# Patient Record
Sex: FEMALE | Race: WHITE | Employment: FULL TIME | URBAN - METROPOLITAN AREA
[De-identification: names, ages, dates, MRNs, and addresses within clinical notes are randomized per-mention and may not be internally consistent; named-entity substitution may affect disease eponyms.]

---

## 2019-09-04 ENCOUNTER — OFFICE VISIT (OUTPATIENT)
Dept: FAMILY MEDICINE | Facility: CLINIC | Age: 34
End: 2019-09-04
Payer: COMMERCIAL

## 2019-09-04 VITALS
SYSTOLIC BLOOD PRESSURE: 125 MMHG | DIASTOLIC BLOOD PRESSURE: 88 MMHG | TEMPERATURE: 98.2 F | WEIGHT: 293 LBS | RESPIRATION RATE: 21 BRPM | BODY MASS INDEX: 47.09 KG/M2 | HEART RATE: 75 BPM | HEIGHT: 66 IN | OXYGEN SATURATION: 96 %

## 2019-09-04 DIAGNOSIS — E66.01 MORBID OBESITY (H): ICD-10-CM

## 2019-09-04 DIAGNOSIS — R40.0 DAYTIME SOMNOLENCE: ICD-10-CM

## 2019-09-04 DIAGNOSIS — Z12.4 SCREENING FOR MALIGNANT NEOPLASM OF CERVIX: ICD-10-CM

## 2019-09-04 DIAGNOSIS — Z00.00 ROUTINE GENERAL MEDICAL EXAMINATION AT A HEALTH CARE FACILITY: Primary | ICD-10-CM

## 2019-09-04 DIAGNOSIS — N91.2 AMENORRHEA: ICD-10-CM

## 2019-09-04 DIAGNOSIS — R06.83 SNORING: ICD-10-CM

## 2019-09-04 PROCEDURE — 99385 PREV VISIT NEW AGE 18-39: CPT | Performed by: PHYSICIAN ASSISTANT

## 2019-09-04 PROCEDURE — G0145 SCR C/V CYTO,THINLAYER,RESCR: HCPCS | Performed by: PHYSICIAN ASSISTANT

## 2019-09-04 PROCEDURE — 87624 HPV HI-RISK TYP POOLED RSLT: CPT | Performed by: PHYSICIAN ASSISTANT

## 2019-09-04 PROCEDURE — 99214 OFFICE O/P EST MOD 30 MIN: CPT | Mod: 25 | Performed by: PHYSICIAN ASSISTANT

## 2019-09-04 ASSESSMENT — MIFFLIN-ST. JEOR: SCORE: 2299.7

## 2019-09-04 ASSESSMENT — PAIN SCALES - GENERAL: PAINLEVEL: NO PAIN (0)

## 2019-09-04 NOTE — PATIENT INSTRUCTIONS
Please schedule a fasting lab appointment (nothing to eat or drink 10    hours prior except water and/or your medications) at your earliest convenience.      Schedule follow up with weight management specialists  Schedule follow up with sleep center  I will send letter with lab results if normal and call with abnormal results.   Consider metformin for possible PCOS     Patient Education             Preventive Health Recommendations  Female Ages 26 - 39  Yearly exam:   See your health care provider every year in order to    Review health changes.     Discuss preventive care.      Review your medicines if you your doctor has prescribed any.    Until age 30: Get a Pap test every three years (more often if you have had an abnormal result).    After age 30: Talk to your doctor about whether you should have a Pap test every 3 years or have a Pap test with HPV screening every 5 years.   You do not need a Pap test if your uterus was removed (hysterectomy) and you have not had cancer.  You should be tested each year for STDs (sexually transmitted diseases), if you're at risk.   Talk to your provider about how often to have your cholesterol checked.  If you are at risk for diabetes, you should have a diabetes test (fasting glucose).  Shots: Get a flu shot each year. Get a tetanus shot every 10 years.   Nutrition:     Eat at least 5 servings of fruits and vegetables each day.    Eat whole-grain bread, whole-wheat pasta and brown rice instead of white grains and rice.    Get adequate Calcium and Vitamin D.     Lifestyle    Exercise at least 150 minutes a week (30 minutes a day, 5 days of the week). This will help you control your weight and prevent disease.    Limit alcohol to one drink per day.    No smoking.     Wear sunscreen to prevent skin cancer.    See your dentist every six months for an exam and cleaning.

## 2019-09-04 NOTE — LETTER
September 15, 2019    Lindsaytravis Singleton  [address]  [facility] [address]    Dear ,  This letter is regarding your recent Pap smear (cervical cancer screening) and Human Papillomavirus (HPV) test.  We are happy to inform you that your Pap smear result is normal. Cervical cancer is closely linked with certain types of HPV. Your results showed no evidence of high-risk HPV.  We recommend you have your next PAP smear and HPV test in 5 years.  You will still need to return to the clinic every year for an annual exam and other preventive tests.  If you have additional questions regarding this result, please call our registered nurse, Joanna at [phone].  Sincerely,    Lorri Johnson PA-C /kumar

## 2019-09-04 NOTE — PROGRESS NOTES
SUBJECTIVE:   CC: Lindsay Singleton is an 34 year old woman who presents for preventive health visit.     Healthy Habits:    Do you get at least three servings of calcium containing foods daily (dairy, green leafy vegetables, etc.)? yes    Amount of exercise or daily activities, outside of work: none    Problems taking medications regularly not applicable    Medication side effects: No    Have you had an eye exam in the past two years? yes    Do you see a dentist twice per year? no    Do you have sleep apnea, excessive snoring or daytime drowsiness?yes          Today's PHQ-2 Score:   PHQ-2 ( 1999 [employer]) 9/4/2019   Q1: Little interest or pleasure in doing things 0   Q2: Feeling down, depressed or hopeless 0   PHQ-2 Score 0       Abuse: Current or Past(Physical, Sexual or Emotional)- No  Do you feel safe in your environment? Yes    Social History     Tobacco Use     Smoking status: Never Smoker     Smokeless tobacco: Never Used   Substance Use Topics     Alcohol use: Yes     If you drink alcohol do you typically have >3 drinks per day or >7 drinks per week? No                     Reviewed orders with patient.  Reviewed health maintenance and updated orders accordingly - Yes  BP Readings from Last 3 Encounters:   09/04/19 125/88    Wt Readings from Last 3 Encounters:   09/04/19 (!) 157.4 kg (347 lb)                  Patient Active Problem List   Diagnosis     Morbid obesity (H)     Past Surgical History:   Procedure Laterality Date     NO HISTORY OF SURGERY         Social History     Tobacco Use     Smoking status: Never Smoker     Smokeless tobacco: Never Used   Substance Use Topics     Alcohol use: Yes     Family History   Problem Relation Age of Onset     Diabetes Paternal Grandmother         late 50s      Unknown/Adopted Mother      Cerebrovascular Disease Paternal Grandfather         78      Colon Cancer No family hx of      Coronary Artery Disease No family hx of          No current outpatient medications on  file.       Mammogram not appropriate for this patient based on age.    Pertinent mammograms are reviewed under the imaging tab.  History of abnormal Pap smear: NO - age 30-65 PAP every 5 years with negative HPV co-testing recommended     Reviewed and updated as needed this visit by clinical staff  Tobacco  Allergies  Meds  Med Hx  Surg Hx  Fam Hx  Soc Hx        Reviewed and updated as needed this visit by Provider  Tobacco  Allergies  Meds  Problems  Med Hx  Surg Hx  Fam Hx  Soc Hx           Pap smear through planned approximately 2 yrs ago normal - but willing to repeat pap today    Works full time for [employer].  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  Never had sleep study  Not opposed to sleep study  As gained weight snoring gets louder and longing through the night  Gained a lot of weight  Has been on weight watchers several times 10 + years  Had lost 85-90 pounds but regained weight - yoyo weight for a lot of years   No kids  Walks on lunch break at work for approximately 1/2 hour    Diet could improve.  weight watchers helps in terms of quantities but could make better food choices.    gmgaro has type 2 diabetes for many years and now has  numbness of feet  Patient Would like to avoid diabetes  As far as know not prediabetes yet  Scheduled appointment to Rule out pcos   Hair on chin more- as of late- issues with it for years but 3 hairs now excessive hair growth.  Substantial weight gain.  Sleep apnea or snoring issues.   Period not regularly- at [employer] now.  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   Several hours  "drive will get tired.  Will fall asleep in front of tv    ROS:  CONSTITUTIONAL:has had significant weight gain  INTEGUMENTARU/SKIN: NEGATIVE for worrisome rashes, moles or lesions  EYES: NEGATIVE for vision changes or irritation  ENT: NEGATIVE for ear, mouth and throat problems  RESP: NEGATIVE for significant cough or SOB  BREAST: NEGATIVE for masses, tenderness or discharge  CV: NEGATIVE for chest pain, palpitations or peripheral edema  GI: NEGATIVE for nausea, abdominal pain, heartburn, or change in bowel habits  : NEGATIVE for unusual urinary or vaginal symptoms. Periods are regular.  MUSCULOSKELETAL: NEGATIVE for significant arthralgias or myalgia  NEURO: NEGATIVE for weakness, dizziness or paresthesias  PSYCHIATRIC: NEGATIVE for changes in mood or affect    OBJECTIVE:   /88 (BP Location: Right arm, Patient Position: Chair, Cuff Size: Adult Large)   Pulse 75   Temp 98.2  F (36.8  C) (Oral)   Resp 21   Ht 1.683 m (5' 6.25\")   Wt (!) 157.4 kg (347 lb)   LMP 08/28/2019 (Approximate)   SpO2 96%   Breastfeeding? No   BMI 55.59 kg/m    EXAM:  GENERAL: alert, no distress and morbid obesity  EYES: Eyes grossly normal to inspection, PERRL and conjunctivae and sclerae normal  HENT: ear canals and TM's normal, nose and mouth without ulcers or lesions  NECK: no adenopathy, no asymmetry, masses, or scars and thyroid normal to palpation  RESP: lungs clear to auscultation - no rales, rhonchi or wheezes  BREAST: normal without masses, tenderness or nipple discharge and no palpable axillary masses or adenopathy  CV: regular rate and rhythm, normal S1 S2, no S3 or S4, no murmur, click or rub, no peripheral edema and peripheral pulses strong  ABDOMEN: soft, nontender, no hepatosplenomegaly, no masses and bowel sounds normal   (female): normal female external genitalia, normal urethral meatus , vaginal mucosa pink, moist, well rugated, vaginal discharge - scant bloody - unable to really visualize cervix due to " body habitus.  Unable to palpate uterus and ovaries due to body habitus   MS: no gross musculoskeletal defects noted, no edema  SKIN: no suspicious lesions or rashes- excessive hair growth of chin  NEURO: Normal strength and tone, mentation intact and speech normal  PSYCH: mentation appears normal, affect normal/bright    Diagnostic Test Results:  none     ASSESSMENT/PLAN:   1. Routine general medical examination at a health care facility  Will return for fasting labs  - Lipid panel reflex to direct LDL Fasting; Future  - Comprehensive metabolic panel; Future  - Hemoglobin A1c; Future  - TSH with free T4 reflex; Future  - Testosterone total; Future    2. Screening for malignant neoplasm of cervix  Pap pending- [facility] but since we are looking at cervix - wanted pap done today as well   - Pap imaged thin layer screen with HPV - recommended age 30 - 65 years (select HPV order below)  - HPV High Risk Types DNA Cervical    3. Morbid obesity (H)  Is interested in lap band and/or medications and help with weight loss - rule out PCOS- consider metformin  Rule out thyroid disease  - BARIATRIC ADULT REFERRAL  - Insulin level; Future  - Lutropin; Future  - Follicle stimulating hormone; Future  - Hemoglobin A1c; Future  - TSH with free T4 reflex; Future  - Testosterone total; Future    4. Amenorrhea  Rule out PCOS- has picture of PCOS with excessive hair of chin and obesity and amenorrhea   - Insulin level; Future  - Lutropin; Future  - Follicle stimulating hormone; Future  - Testosterone total; Future  - Prolactin; Future    5. Snoring  Referred to sleep specialist to rule out sleep apnea   - SLEEP EVALUATION & MANAGEMENT REFERRAL - [facility]  [phone] (Age 15 and up); Future    6. Daytime somnolence  As above   - SLEEP EVALUATION & MANAGEMENT REFERRAL - [facility]  [phone] (Age 15 and up); Future    COUNSELING:  "  Reviewed preventive health counseling, as reflected in patient instructions       Regular exercise       Healthy diet/nutrition       Vision screening       Family planning       Osteoporosis Prevention/Bone Health    Estimated body mass index is 55.59 kg/m  as calculated from the following:    Height as of this encounter: 1.683 m (5' 6.25\").    Weight as of this encounter: 157.4 kg (347 lb).    Weight management plan: Patient referred to endocrine and/or weight management specialty     reports that she has never smoked. She has never used smokeless tobacco.      Counseling Resources:  ATP IV Guidelines  Pooled Cohorts Equation Calculator  Breast Cancer Risk Calculator  FRAX Risk Assessment  ICSI Preventive Guidelines  Dietary Guidelines for Americans, 2010  USDA's MyPlate  ASA Prophylaxis  Lung CA Screening    Lorri Johnson PA-C  [facility]  "

## 2019-09-10 LAB
COPATH REPORT: NORMAL
FINAL DIAGNOSIS: NORMAL
HPV HR 12 DNA CVX QL NAA+PROBE: NEGATIVE
HPV16 DNA SPEC QL NAA+PROBE: NEGATIVE
HPV18 DNA SPEC QL NAA+PROBE: NEGATIVE
PAP: NORMAL
SPECIMEN DESCRIPTION: NORMAL
SPECIMEN SOURCE CVX/VAG CYTO: NORMAL

## 2019-10-18 ENCOUNTER — TELEPHONE (OUTPATIENT)
Dept: FAMILY MEDICINE | Facility: CLINIC | Age: 34
End: 2019-10-18

## 2019-10-18 ENCOUNTER — ALLIED HEALTH/NURSE VISIT (OUTPATIENT)
Dept: NURSING | Facility: CLINIC | Age: 34
End: 2019-10-18
Payer: COMMERCIAL

## 2019-10-18 VITALS
WEIGHT: 293 LBS | OXYGEN SATURATION: 99 % | SYSTOLIC BLOOD PRESSURE: 128 MMHG | HEIGHT: 66 IN | DIASTOLIC BLOOD PRESSURE: 86 MMHG | HEART RATE: 77 BPM | RESPIRATION RATE: 24 BRPM | TEMPERATURE: 98.2 F | BODY MASS INDEX: 47.09 KG/M2

## 2019-10-18 DIAGNOSIS — Z00.00 ROUTINE GENERAL MEDICAL EXAMINATION AT A HEALTH CARE FACILITY: ICD-10-CM

## 2019-10-18 DIAGNOSIS — E66.01 MORBID OBESITY (H): ICD-10-CM

## 2019-10-18 DIAGNOSIS — N91.2 AMENORRHEA: ICD-10-CM

## 2019-10-18 DIAGNOSIS — Z23 NEED FOR PROPHYLACTIC VACCINATION AND INOCULATION AGAINST INFLUENZA: Primary | ICD-10-CM

## 2019-10-18 LAB
ALBUMIN SERPL-MCNC: 3.5 G/DL (ref 3.4–5)
ALP SERPL-CCNC: 98 U/L (ref 40–150)
ALT SERPL W P-5'-P-CCNC: 101 U/L (ref 0–50)
ANION GAP SERPL CALCULATED.3IONS-SCNC: 7 MMOL/L (ref 3–14)
AST SERPL W P-5'-P-CCNC: 70 U/L (ref 0–45)
BILIRUB SERPL-MCNC: 1.1 MG/DL (ref 0.2–1.3)
BUN SERPL-MCNC: 12 MG/DL (ref 7–30)
CALCIUM SERPL-MCNC: 9.2 MG/DL (ref 8.5–10.1)
CHLORIDE SERPL-SCNC: 103 MMOL/L (ref 94–109)
CHOLEST SERPL-MCNC: 171 MG/DL
CO2 SERPL-SCNC: 27 MMOL/L (ref 20–32)
CREAT SERPL-MCNC: 0.48 MG/DL (ref 0.52–1.04)
FSH SERPL-ACNC: 5.1 IU/L
GFR SERPL CREATININE-BSD FRML MDRD: >90 ML/MIN/{1.73_M2}
GLUCOSE SERPL-MCNC: 254 MG/DL (ref 70–99)
HBA1C MFR BLD: 10.4 % (ref 0–5.6)
HDLC SERPL-MCNC: 32 MG/DL
INSULIN SERPL-ACNC: 22.3 MU/L (ref 3–25)
LDLC SERPL CALC-MCNC: 100 MG/DL
LH SERPL-ACNC: 4.4 IU/L
NONHDLC SERPL-MCNC: 139 MG/DL
POTASSIUM SERPL-SCNC: 4.1 MMOL/L (ref 3.4–5.3)
PROLACTIN SERPL-MCNC: 5 UG/L (ref 3–27)
PROT SERPL-MCNC: 7.8 G/DL (ref 6.8–8.8)
SODIUM SERPL-SCNC: 137 MMOL/L (ref 133–144)
TRIGL SERPL-MCNC: 194 MG/DL
TSH SERPL DL<=0.005 MIU/L-ACNC: 3.71 MU/L (ref 0.4–4)

## 2019-10-18 PROCEDURE — 99207 ZZC NO CHARGE NURSE ONLY: CPT

## 2019-10-18 PROCEDURE — 80053 COMPREHEN METABOLIC PANEL: CPT | Performed by: PHYSICIAN ASSISTANT

## 2019-10-18 PROCEDURE — 36415 COLL VENOUS BLD VENIPUNCTURE: CPT | Performed by: PHYSICIAN ASSISTANT

## 2019-10-18 PROCEDURE — 84403 ASSAY OF TOTAL TESTOSTERONE: CPT | Performed by: PHYSICIAN ASSISTANT

## 2019-10-18 PROCEDURE — 83001 ASSAY OF GONADOTROPIN (FSH): CPT | Performed by: PHYSICIAN ASSISTANT

## 2019-10-18 PROCEDURE — 83002 ASSAY OF GONADOTROPIN (LH): CPT | Performed by: PHYSICIAN ASSISTANT

## 2019-10-18 PROCEDURE — 83036 HEMOGLOBIN GLYCOSYLATED A1C: CPT | Performed by: PHYSICIAN ASSISTANT

## 2019-10-18 PROCEDURE — 84146 ASSAY OF PROLACTIN: CPT | Performed by: PHYSICIAN ASSISTANT

## 2019-10-18 PROCEDURE — 80061 LIPID PANEL: CPT | Performed by: PHYSICIAN ASSISTANT

## 2019-10-18 PROCEDURE — 83525 ASSAY OF INSULIN: CPT | Performed by: PHYSICIAN ASSISTANT

## 2019-10-18 PROCEDURE — 90471 IMMUNIZATION ADMIN: CPT

## 2019-10-18 PROCEDURE — 90686 IIV4 VACC NO PRSV 0.5 ML IM: CPT

## 2019-10-18 PROCEDURE — 84443 ASSAY THYROID STIM HORMONE: CPT | Performed by: PHYSICIAN ASSISTANT

## 2019-10-18 ASSESSMENT — MIFFLIN-ST. JEOR: SCORE: 2276.57

## 2019-10-18 ASSESSMENT — PAIN SCALES - GENERAL: PAINLEVEL: NO PAIN (0)

## 2019-10-18 NOTE — TELEPHONE ENCOUNTER
This writer attempted to contact Lindsay on 10/18/19      Reason for call results and left message.      If patient calls back:   Registered Nurse called. Follow Triage Call workflow        Lindy Pleitez RN         Notes recorded by Lorri Johnson PA-C on 10/18/2019 at 1:30 PM CDT  Please call and advise - also sent mychart message    Robbin Montoya  Your labs indicate that you have diabetes. Please schedule an appointment with me to discuss as soon as possible.   Lorri Johnson, PAC

## 2019-10-18 NOTE — RESULT ENCOUNTER NOTE
Robbin Montoya  Your thyroid function was normal.   Your blood sugar was 254- normal is less than 100.  You have diabetes.   Your liver function tests were a bit high as well.   Your triglycerides were above normal.  Poor diet, genetics and being overweight can contribute to this.  Maintaining a healthy diet with lean proteins, whole grains and healthy fats such as olive oil as well as regular exercise and maintaining an appropriate weight all contribute to healthier cholesterol levels.    I will notify you of the rest of your labs as they become available.   Please call or MyChart my office with any questions or concerns.    Lorri Johnson, PAC

## 2019-10-18 NOTE — RESULT ENCOUNTER NOTE
Please call and advise - also sent Zeo message    Robbin Montoya  Your labs indicate that you have diabetes. Please schedule an appointment with me to discuss as soon as possible.   Lorri Johnson, PAC

## 2019-10-22 LAB — TESTOST SERPL-MCNC: 31 NG/DL (ref 8–60)

## 2019-10-22 NOTE — RESULT ENCOUNTER NOTE
Robbin Montoya  Your testosterone levels, insulin levels and hormone levels were normal.  Please schedule an appointment with me to discus your diabetes as soon as possible.   Please call or MyChart my office with any questions or concerns.    Lorri Johnson, PAC

## 2019-10-25 ENCOUNTER — OFFICE VISIT (OUTPATIENT)
Dept: FAMILY MEDICINE | Facility: CLINIC | Age: 34
End: 2019-10-25
Payer: COMMERCIAL

## 2019-10-25 VITALS
HEIGHT: 66 IN | BODY MASS INDEX: 47.09 KG/M2 | HEART RATE: 88 BPM | DIASTOLIC BLOOD PRESSURE: 88 MMHG | TEMPERATURE: 98.3 F | SYSTOLIC BLOOD PRESSURE: 136 MMHG | RESPIRATION RATE: 19 BRPM | OXYGEN SATURATION: 96 % | WEIGHT: 293 LBS

## 2019-10-25 DIAGNOSIS — E11.65 TYPE 2 DIABETES MELLITUS WITH HYPERGLYCEMIA, WITHOUT LONG-TERM CURRENT USE OF INSULIN (H): Primary | ICD-10-CM

## 2019-10-25 PROBLEM — E11.9 DIABETES MELLITUS, TYPE 2 (H): Status: ACTIVE | Noted: 2019-10-25

## 2019-10-25 PROCEDURE — 99213 OFFICE O/P EST LOW 20 MIN: CPT | Performed by: PHYSICIAN ASSISTANT

## 2019-10-25 RX ORDER — METFORMIN HCL 500 MG
TABLET, EXTENDED RELEASE 24 HR ORAL
Qty: 120 TABLET | Refills: 2 | Status: SHIPPED | OUTPATIENT
Start: 2019-10-25 | End: 2022-03-01

## 2019-10-25 RX ORDER — ATORVASTATIN CALCIUM 10 MG/1
10 TABLET, FILM COATED ORAL DAILY
Qty: 30 TABLET | Refills: 1 | Status: SHIPPED | OUTPATIENT
Start: 2019-10-25 | End: 2022-03-01

## 2019-10-25 RX ORDER — LISINOPRIL 2.5 MG/1
2.5 TABLET ORAL DAILY
Qty: 30 TABLET | Refills: 1 | Status: SHIPPED | OUTPATIENT
Start: 2019-10-25 | End: 2022-03-01

## 2019-10-25 ASSESSMENT — MIFFLIN-ST. JEOR: SCORE: 2263.41

## 2019-10-25 ASSESSMENT — PAIN SCALES - GENERAL: PAINLEVEL: NO PAIN (0)

## 2019-10-25 NOTE — PROGRESS NOTES
Subjective     Lindsay Singleton is a 34 year old female who presents to clinic today for the following health issues:    HPI   Discuss recent labs  Here to discuss new diagnosis of diabetes   Has general fatigue.  Believes that she Probably has had diabetes for a year.   Feeling kind of blah for about a year.  Frequency of urination. No thirst more than normal.,   Trying to drink recommended amount of water a day.  Mostly at night can wake up 3 + times a night   Sleep is interrupted.  Recently signed up with weight watchers and has been able to lose approximately 90 pounds in past with weight watchers  Exercise - walking on lunch breaks   Used to jog 2 1/2 miles 4 1/2 days a week- has joined a gym   Has not had eye exam in last 1 1/2 yrs  Is interested in med to help with weight loss    Results for orders placed or performed in visit on 10/18/19   Prolactin   Result Value Ref Range    Prolactin 5 3 - 27 ug/L   Testosterone total   Result Value Ref Range    Testosterone Total 31 8 - 60 ng/dL   TSH with free T4 reflex   Result Value Ref Range    TSH 3.71 0.40 - 4.00 mU/L   Hemoglobin A1c   Result Value Ref Range    Hemoglobin A1C 10.4 (H) 0 - 5.6 %   Follicle stimulating hormone   Result Value Ref Range    FSH 5.1 IU/L   Lutropin   Result Value Ref Range    Lutropin 4.4 IU/L   Insulin level   Result Value Ref Range    Insulin 22.3 3 - 25 mU/L   Comprehensive metabolic panel   Result Value Ref Range    Sodium 137 133 - 144 mmol/L    Potassium 4.1 3.4 - 5.3 mmol/L    Chloride 103 94 - 109 mmol/L    Carbon Dioxide 27 20 - 32 mmol/L    Anion Gap 7 3 - 14 mmol/L    Glucose 254 (H) 70 - 99 mg/dL    Urea Nitrogen 12 7 - 30 mg/dL    Creatinine 0.48 (L) 0.52 - 1.04 mg/dL    GFR Estimate >90 >60 mL/min/[1.73_m2]    GFR Estimate If Black >90 >60 mL/min/[1.73_m2]    Calcium 9.2 8.5 - 10.1 mg/dL    Bilirubin Total 1.1 0.2 - 1.3 mg/dL    Albumin 3.5 3.4 - 5.0 g/dL    Protein Total 7.8 6.8 - 8.8 g/dL    Alkaline Phosphatase 98 40 -  "150 U/L     (H) 0 - 50 U/L    AST 70 (H) 0 - 45 U/L   Lipid panel reflex to direct LDL Fasting   Result Value Ref Range    Cholesterol 171 <200 mg/dL    Triglycerides 194 (H) <150 mg/dL    HDL Cholesterol 32 (L) >49 mg/dL    LDL Cholesterol Calculated 100 (H) <100 mg/dL    Non HDL Cholesterol 139 (H) <130 mg/dL      Patient Active Problem List   Diagnosis     Morbid obesity (H)     Diabetes mellitus, type 2 (H)     Past Surgical History:   Procedure Laterality Date     NO HISTORY OF SURGERY         Social History     Tobacco Use     Smoking status: Never Smoker     Smokeless tobacco: Never Used   Substance Use Topics     Alcohol use: Yes     Family History   Problem Relation Age of Onset     Diabetes Paternal Grandmother         late 50s      Unknown/Adopted Mother      Cerebrovascular Disease Paternal Grandfather         78      Colon Cancer No family hx of      Coronary Artery Disease No family hx of      Breast Cancer No family hx of          Current Outpatient Medications   Medication Sig Dispense Refill    None                        BP Readings from Last 3 Encounters:   10/25/19 136/88   10/18/19 128/86   09/04/19 125/88    Wt Readings from Last 3 Encounters:   10/25/19 (!) 153.8 kg (339 lb)   10/18/19 (!) 155.1 kg (341 lb 14.4 oz)   09/04/19 (!) 157.4 kg (347 lb)                      Reviewed and updated as needed this visit by Provider  Tobacco  Allergies  Meds  Problems  Med Hx  Surg Hx  Fam Hx         Review of Systems   ROS COMP: Constitutional, HEENT, cardiovascular, pulmonary, gi and gu systems are negative, except as otherwise noted.      Objective    /88 (BP Location: Right arm, Patient Position: Chair, Cuff Size: Adult Large)   Pulse 88   Temp 98.3  F (36.8  C) (Oral)   Resp 19   Ht 1.683 m (5' 6.25\")   Wt (!) 153.8 kg (339 lb)   LMP  (Approximate)   SpO2 96%   Breastfeeding? No   BMI 54.30 kg/m    Body mass index is 54.3 kg/m .  Physical Exam   GENERAL: alert, no " "distress and obese    Diagnostic Test Results:  none         Assessment & Plan     1. Type 2 diabetes mellitus with hyperglycemia, without long-term current use of insulin (H)  New diagnosis . A1C 10.4.  Follow up with diabetes educator as soon as possible.  Start metformin and follow up with us in 3 months  Work on diet and exercise.   Start lisinopril to protect the kidneys  Start lipitor   Follow up with eye doctor as soon as possible   - metFORMIN (GLUCOPHAGE-XR) 500 MG 24 hr tablet; Take 500 mg with evening meal for 7 days then 500 mg twice a day with meals for 7 days then 1000 mg with evening meal for 7 days then 1000 mg twice a day with meals  Dispense: 120 tablet; Refill: 2  - lisinopril (PRINIVIL/ZESTRIL) 2.5 MG tablet; Take 1 tablet (2.5 mg) by mouth daily  Dispense: 30 tablet; Refill: 1  - atorvastatin (LIPITOR) 10 MG tablet; Take 1 tablet (10 mg) by mouth daily  Dispense: 30 tablet; Refill: 1  - AMBULATORY ADULT DIABETES EDUCATOR REFERRAL  - Albumin Random Urine Quantitative with Creat Ratio; Future  - Glucose; Future     BMI:   Estimated body mass index is 54.3 kg/m  as calculated from the following:    Height as of this encounter: 1.683 m (5' 6.25\").    Weight as of this encounter: 153.8 kg (339 lb).   Weight management plan: Discussed healthy diet and exercise guidelines advised that if not losing weight over the next couple months we can consider phentermine to help with weight loss        Patient Instructions   Schedule appointment with diabetes educator as soon as possible  Start metformin and ramp up dose up to 1000 mg twice a day with meals  Follow up with us in approximately 3 months  Schedule fasting labs in approximately 2 months to recheck cholesterol and liver function tests   Start lipitor 10 mg daily- let us know if side effects   Start lisinopril 2.5 mg daily   Work on diet and exercise   Schedule eye exam with eye doctor and have results sent to us- must be a dilated eye exam "       Return in about 3 months (around 1/25/2020), or if symptoms worsen or fail to improve, for Routine Visit.    Lorri Johnson PA-C  [facility]

## 2019-10-25 NOTE — PATIENT INSTRUCTIONS
Schedule appointment with diabetes educator as soon as possible  Start metformin and ramp up dose up to 1000 mg twice a day with meals  Follow up with us in approximately 3 months  Schedule fasting labs in approximately 2 months to recheck cholesterol and liver function tests   Start lipitor 10 mg daily- let us know if side effects   Start lisinopril 2.5 mg daily   Work on diet and exercise   Schedule eye exam with eye doctor and have results sent to us- must be a dilated eye exam

## 2019-10-28 ENCOUNTER — TELEPHONE (OUTPATIENT)
Dept: FAMILY MEDICINE | Facility: CLINIC | Age: 34
End: 2019-10-28

## 2019-10-28 NOTE — TELEPHONE ENCOUNTER
Diabetes Education Scheduling Outreach #1:    Call to patient to schedule. Left message with phone number to call to schedule.    Plan for 2nd outreach attempt within 2 business days.    [facility]  Diabetes and Nutrition Scheduling

## 2019-10-30 NOTE — TELEPHONE ENCOUNTER
Diabetes Education Scheduling Outreach #2:    Call to patient to schedule. Left message with phone number to call to schedule.    Dotty Mayberry  [place] [facility]  Diabetes and Nutrition Scheduling

## 2020-03-11 ENCOUNTER — HEALTH MAINTENANCE LETTER (OUTPATIENT)
Age: 35
End: 2020-03-11

## 2021-01-03 ENCOUNTER — HEALTH MAINTENANCE LETTER (OUTPATIENT)
Age: 36
End: 2021-01-03

## 2021-04-23 ENCOUNTER — IMMUNIZATION (OUTPATIENT)
Dept: NURSING | Facility: CLINIC | Age: 36
End: 2021-04-23
Payer: COMMERCIAL

## 2021-04-23 PROCEDURE — 91300 PR COVID VAC PFIZER DIL RECON 30 MCG/0.3 ML IM: CPT

## 2021-04-23 PROCEDURE — 0001A PR COVID VAC PFIZER DIL RECON 30 MCG/0.3 ML IM: CPT

## 2021-04-25 ENCOUNTER — HEALTH MAINTENANCE LETTER (OUTPATIENT)
Age: 36
End: 2021-04-25

## 2021-05-14 ENCOUNTER — IMMUNIZATION (OUTPATIENT)
Dept: NURSING | Facility: CLINIC | Age: 36
End: 2021-05-14
Attending: INTERNAL MEDICINE
Payer: COMMERCIAL

## 2021-05-14 PROCEDURE — 91300 PR COVID VAC PFIZER DIL RECON 30 MCG/0.3 ML IM: CPT

## 2021-05-14 PROCEDURE — 0002A PR COVID VAC PFIZER DIL RECON 30 MCG/0.3 ML IM: CPT

## 2021-08-15 ENCOUNTER — HEALTH MAINTENANCE LETTER (OUTPATIENT)
Age: 36
End: 2021-08-15

## 2021-10-10 ENCOUNTER — HEALTH MAINTENANCE LETTER (OUTPATIENT)
Age: 36
End: 2021-10-10

## 2021-12-05 ENCOUNTER — HEALTH MAINTENANCE LETTER (OUTPATIENT)
Age: 36
End: 2021-12-05

## 2022-01-30 ENCOUNTER — HEALTH MAINTENANCE LETTER (OUTPATIENT)
Age: 37
End: 2022-01-30

## 2022-03-01 ENCOUNTER — OFFICE VISIT (OUTPATIENT)
Dept: FAMILY MEDICINE | Facility: CLINIC | Age: 37
End: 2022-03-01
Payer: COMMERCIAL

## 2022-03-01 VITALS
SYSTOLIC BLOOD PRESSURE: 144 MMHG | BODY MASS INDEX: 48.7 KG/M2 | HEART RATE: 97 BPM | WEIGHT: 293 LBS | DIASTOLIC BLOOD PRESSURE: 92 MMHG | OXYGEN SATURATION: 95 % | RESPIRATION RATE: 18 BRPM | TEMPERATURE: 98.2 F

## 2022-03-01 DIAGNOSIS — J01.00 ACUTE NON-RECURRENT MAXILLARY SINUSITIS: Primary | ICD-10-CM

## 2022-03-01 DIAGNOSIS — Z13.29 SCREENING FOR THYROID DISORDER: ICD-10-CM

## 2022-03-01 DIAGNOSIS — Z13.6 CARDIOVASCULAR SCREENING; LDL GOAL LESS THAN 100: ICD-10-CM

## 2022-03-01 DIAGNOSIS — R80.9 MICROALBUMINURIA: ICD-10-CM

## 2022-03-01 DIAGNOSIS — E66.01 MORBID OBESITY (H): ICD-10-CM

## 2022-03-01 DIAGNOSIS — E11.65 TYPE 2 DIABETES MELLITUS WITH HYPERGLYCEMIA, WITHOUT LONG-TERM CURRENT USE OF INSULIN (H): ICD-10-CM

## 2022-03-01 LAB
ALBUMIN SERPL-MCNC: 3.7 G/DL (ref 3.4–5)
ALP SERPL-CCNC: 94 U/L (ref 40–150)
ALT SERPL W P-5'-P-CCNC: 40 U/L (ref 0–50)
ANION GAP SERPL CALCULATED.3IONS-SCNC: 6 MMOL/L (ref 3–14)
AST SERPL W P-5'-P-CCNC: 21 U/L (ref 0–45)
BILIRUB SERPL-MCNC: 1.2 MG/DL (ref 0.2–1.3)
BUN SERPL-MCNC: 14 MG/DL (ref 7–30)
CALCIUM SERPL-MCNC: 9.2 MG/DL (ref 8.5–10.1)
CHLORIDE BLD-SCNC: 102 MMOL/L (ref 94–109)
CHOLEST SERPL-MCNC: 170 MG/DL
CO2 SERPL-SCNC: 27 MMOL/L (ref 20–32)
CREAT SERPL-MCNC: 0.59 MG/DL (ref 0.52–1.04)
CREAT UR-MCNC: 121 MG/DL
FASTING STATUS PATIENT QL REPORTED: YES
GFR SERPL CREATININE-BSD FRML MDRD: >90 ML/MIN/1.73M2
GLUCOSE BLD-MCNC: 334 MG/DL (ref 70–99)
HBA1C MFR BLD: 11.9 % (ref 0–5.6)
HDLC SERPL-MCNC: 31 MG/DL
LDLC SERPL CALC-MCNC: 71 MG/DL
MICROALBUMIN UR-MCNC: 224 MG/L
MICROALBUMIN/CREAT UR: 185.12 MG/G CR (ref 0–25)
NONHDLC SERPL-MCNC: 139 MG/DL
POTASSIUM BLD-SCNC: 4.7 MMOL/L (ref 3.4–5.3)
PROT SERPL-MCNC: 7.9 G/DL (ref 6.8–8.8)
SODIUM SERPL-SCNC: 135 MMOL/L (ref 133–144)
TRIGL SERPL-MCNC: 340 MG/DL
TSH SERPL DL<=0.005 MIU/L-ACNC: 2.91 MU/L (ref 0.4–4)

## 2022-03-01 PROCEDURE — 80061 LIPID PANEL: CPT | Performed by: NURSE PRACTITIONER

## 2022-03-01 PROCEDURE — 84443 ASSAY THYROID STIM HORMONE: CPT | Performed by: NURSE PRACTITIONER

## 2022-03-01 PROCEDURE — 82043 UR ALBUMIN QUANTITATIVE: CPT | Performed by: NURSE PRACTITIONER

## 2022-03-01 PROCEDURE — 99204 OFFICE O/P NEW MOD 45 MIN: CPT | Performed by: NURSE PRACTITIONER

## 2022-03-01 PROCEDURE — 36415 COLL VENOUS BLD VENIPUNCTURE: CPT | Performed by: NURSE PRACTITIONER

## 2022-03-01 PROCEDURE — 80053 COMPREHEN METABOLIC PANEL: CPT | Performed by: NURSE PRACTITIONER

## 2022-03-01 PROCEDURE — 83036 HEMOGLOBIN GLYCOSYLATED A1C: CPT | Performed by: NURSE PRACTITIONER

## 2022-03-01 RX ORDER — LISINOPRIL 10 MG/1
10 TABLET ORAL DAILY
Qty: 90 TABLET | Refills: 0 | Status: SHIPPED | OUTPATIENT
Start: 2022-03-01 | End: 2023-10-02

## 2022-03-01 RX ORDER — LANCETS
EACH MISCELLANEOUS
Qty: 100 EACH | Refills: 6 | Status: SHIPPED | OUTPATIENT
Start: 2022-03-01 | End: 2022-07-18

## 2022-03-01 RX ORDER — GLUCOSAMINE HCL/CHONDROITIN SU 500-400 MG
CAPSULE ORAL
Qty: 100 EACH | Refills: 3 | Status: SHIPPED | OUTPATIENT
Start: 2022-03-01 | End: 2022-07-18

## 2022-03-01 RX ORDER — METFORMIN HCL 500 MG
500 TABLET, EXTENDED RELEASE 24 HR ORAL 2 TIMES DAILY WITH MEALS
Qty: 180 TABLET | Refills: 0 | Status: SHIPPED | OUTPATIENT
Start: 2022-03-01 | End: 2022-07-11

## 2022-03-01 ASSESSMENT — PAIN SCALES - GENERAL: PAINLEVEL: NO PAIN (1)

## 2022-03-01 NOTE — PATIENT INSTRUCTIONS
PLAN:   1.   Symptomatic therapy suggested: rest, increase fluids, apply heat to sinuses prn, use mist of vaporizer prn, OTC acetaminophen, ibuprofen, saline nasal spray as needed and call prn if symptoms persist or worsen.  2.  Orders Placed This Encounter   Medications     amoxicillin-clavulanate (AUGMENTIN) 875-125 MG tablet     Sig: Take 1 tablet by mouth 2 times daily     Dispense:  20 tablet     Refill:  0     Orders Placed This Encounter   Procedures     REVIEW OF HEALTH MAINTENANCE PROTOCOL ORDERS     Albumin Random Urine Quantitative with Creat Ratio     HEMOGLOBIN A1C     Lipid panel reflex to direct LDL Fasting     Comprehensive metabolic panel     TSH with free T4 reflex       3. Patient needs to follow up in if no improvement,or sooner if worsening of symptoms or other symptoms develop.  Work on weight loss  Regular exercise  Will follow up and/or notify patient of  results via My Chart to determine further need for followup  Schedule physical exam     A1c(%) Mean blood sugar (mg/dl)  6 135  7 170  8 205  9 240  10 275  11 310  12 345        Patient Education     Understanding Acute Rhinosinusitis  Acute rhinosinusitis is when the lining of the inside of the nose and the sinuses becomes irritated and swollen. It is also called sinusitis, or a sinus infection.  Sinuses are air-filled spaces in the skull behind the face. They are kept moist and clean by a lining of mucosa. Things such as pollen, smoke, and chemical fumes can irritate the mucosa. It can then swell up. As a response to irritation, the mucosa makes more mucus and other fluids. Tiny hairlike cilia cover the mucosa. Cilia help carry mucus toward the opening of the sinus. Too much mucus may cause the cilia to stop working. This blocks the sinus opening. A buildup of fluid in the sinuses then causes pain and pressure. It can also cause bacteria to grow in the sinuses.    What causes acute rhinosinusitis?  A sinus infection is most often caused  by a virus. You are more likely to get one after having a cold or the flu. In some cases, a sinus infection can be caused by bacteria.  You are at higher risk for a sinus infection if you:    Are older in age    Have structural problems with your sinuses    Smoke or are exposed to secondhand smoke    Are exposed to changes in pressure, such as from flying a lot or deep sea diving    Have asthma or allergies    Have a weak immune system    Have dental disease     Symptoms of acute rhinosinusitis  Symptoms of acute rhinosinusitis often last around 7 to 10 days. If you have a bacterial infection, they may last longer. They may also get better but then worsen. You may have:    Face pain or pressure under the eyes and around the nose    Headache    Fluid draining in the back of the throat (postnasal drip)    Congestion    Drainage that is thick and colored (often green), instead of clear    Cough    Problems with your sense of smell    Ear pain or hearing problems    Fever    Tooth pain    Fatigue  Diagnosing acute rhinosinusitis  Your healthcare provider will ask about your symptoms and past health. He or she will look at your ears, nose, throat, and sinuses. Imaging tests, such as X-rays, are often not needed.  It can be hard to figure out if a sinus infection is caused by a virus or bacterium. A bacterial infection tends to last longer. Symptoms may also get better but then worsen. Your healthcare provider may take a sample of mucus from your nose to check for bacteria.  Treating acute rhinosinusitis  Most sinus infections will go away within 10 days. Your body will fight off the virus. If your symptoms seem to get better but then worsen, you may have a bacterial infection instead. Your healthcare provider will then give you antibiotics. Take this medicine until it is gone, even if you feel better.  To help ease your symptoms, your healthcare provider may advise:    Over-the-counter pain relievers. Medicines such as  acetaminophen or ibuprofen can ease sinus pain. They may also lower a fever.    Nasal washes. Washing your nasal passages with salt water may ease pain and pressure. It can rinse out mucous and other irritants from your sinuses. Your healthcare provider can show you how to do it.    Nasal steroid spray. This prescription medicine can reduce inflammation in your sinuses.    Other medicines. Decongestants, antihistamines, and other nasal sprays may give short-term relief. They may help with congestion. Talk with your healthcare provider before taking these medicines.     Preventing acute rhinosinusitis  You can help prevent a sinus infection with these steps:    Wash your hands well and often.    Stay away from people who have a cold or upper respiratory infection.    Don't smoke. And stay away from secondhand smoke.    Use a humidifier at home.    Make sure you are up-to-date on your vaccines, such as the flu shot.     When to call your healthcare provider  Call your healthcare provider right away if you have any of these:    Fever of 100.4 F (38 C) or higher, or as directed by your healthcare provider    Pain that gets worse    Symptoms that don t get better, or get worse    New symptoms  [employer] last reviewed this educational content on 6/1/2019 2000-2021 [employer]. All rights reserved. This information is not intended as a substitute for professional medical care. Always follow your healthcare professional's instructions.

## 2022-03-01 NOTE — PROGRESS NOTES
Jerica Montoya is a 36 year old who presents for the following health issues     History of Present Illness     Reason for visit:  Ear ache, stuffy nose, sore throat  Symptom onset:  3-7 days ago  Symptoms include:  Ear ache stuffy nose sore throat  Symptom intensity:  Moderate  Symptom progression:  Worsening  Had these symptoms before:  Yes  Has tried/received treatment for these symptoms:  Yes  Previous treatment was successful:  Yes  Prior treatment description:  Antibiotics  What makes it worse:  No  What makes it better:  Herbal tea    She eats 4 or more servings of fruits and vegetables daily.She consumes 0 sweetened beverage(s) daily.She exercises with enough effort to increase her heart rate 10 to 19 minutes per day.  She exercises with enough effort to increase her heart rate 3 or less days per week.   She is taking medications regularly.       RESPIRATORY SYMPTOMS      Duration: Possible sinus infection, symptoms for about a week, bright yellow mucus, hurts to swallow, and an ear ache in R ear. All symptoms seem to be in ENT. No cough.     Description  nasal congestion, facial pain/pressure and ear pain right    Severity: mild    Accompanying signs and symptoms: None    History (predisposing factors):  none    Precipitating or alleviating factors: None    Therapies tried and outcome:  rest and fluids        Diabetes Follow-up      How often are you checking your blood sugar? Not at all    What concerns do you have today about your diabetes? Other: has not seen provider since 2019      Do you have any of these symptoms? (Select all that apply)  Excessive thirst    Have you had a diabetic eye exam in the last 12 months? No  Has not been seen for this since 2019   Was on metformin but has not been on it for at least 2 years  Has lost almost 40 lbs since that visit in 2019       BP Readings from Last 2 Encounters:   03/01/22 (!) 144/92   10/25/19 136/88     Hemoglobin A1C POCT (%)   Date Value    10/18/2019 10.4 (H)     Hemoglobin A1C (%)   Date Value   03/01/2022 11.9 (H)     LDL Cholesterol Calculated (mg/dL)   Date Value   03/01/2022 71   10/18/2019 100 (H)           {Reference  Diabetes Log - 7 days :547909}    Lab work is in process  Labs reviewed in EPIC  BP Readings from Last 3 Encounters:   03/01/22 (!) 144/92   10/25/19 136/88   10/18/19 128/86    Wt Readings from Last 3 Encounters:   03/01/22 137.9 kg (304 lb)   10/25/19 (!) 153.8 kg (339 lb)   10/18/19 (!) 155.1 kg (341 lb 14.4 oz)                  Patient Active Problem List   Diagnosis     Morbid obesity (H)     Diabetes mellitus, type 2 (H)     Past Surgical History:   Procedure Laterality Date     NO HISTORY OF SURGERY         Social History     Tobacco Use     Smoking status: Never Smoker     Smokeless tobacco: Never Used   Substance Use Topics     Alcohol use: Yes     Comment: occ.      Family History   Problem Relation Age of Onset     Diabetes Paternal Grandmother         late 50s      Unknown/Adopted Mother      Cerebrovascular Disease Paternal Grandfather         78      Colon Cancer No family hx of      Coronary Artery Disease No family hx of      Breast Cancer No family hx of          Current Outpatient Medications   Medication Sig Dispense Refill     amoxicillin-clavulanate (AUGMENTIN) 875-125 MG tablet Take 1 tablet by mouth 2 times daily 20 tablet 0     Allergies   Allergen Reactions     Septra [Sulfamethoxazole W/Trimethoprim] Swelling and Rash         Review of Systems   CONSTITUTIONAL:NEGATIVE for fever, chills, change in weight  ENT/MOUTH: POSITIVE for ear pain right, nasal congestion, postnasal drainage and sinus pressure and NEGATIVE for epistaxis and fever  RESP:NEGATIVE for significant cough or SOB  CV: NEGATIVE for chest pain, palpitations or peripheral edema  GI: NEGATIVE for nausea, abdominal pain, heartburn, or change in bowel habits  MUSCULOSKELETAL: NEGATIVE for significant arthralgias or myalgia  NEURO: NEGATIVE  for weakness, dizziness or paresthesias  ENDOCRINE: POSITIVE  for HX diabetes and polydipsia and NEGATIVE for polyphagia and polyuria  HEME/ALLERGY/IMMUNE: NEGATIVE for bleeding problems      Objective    BP (!) 144/92   Pulse 97   Temp 98.2  F (36.8  C) (Tympanic)   Resp 18   Wt 137.9 kg (304 lb)   LMP 01/25/2022 (Approximate)   SpO2 95%   BMI 48.70 kg/m    Body mass index is 48.7 kg/m .   Wt Readings from Last 4 Encounters:   03/01/22 137.9 kg (304 lb)   10/25/19 (!) 153.8 kg (339 lb)   10/18/19 (!) 155.1 kg (341 lb 14.4 oz)   09/04/19 (!) 157.4 kg (347 lb)     BP Readings from Last 6 Encounters:   03/01/22 (!) 144/92   10/25/19 136/88   10/18/19 128/86   09/04/19 125/88       Physical Exam   GENERAL: Patient is well nourished, well developed, obese,in no apparent distress, non-toxic, in no respiratory distress and acyanotic, alert, cooperative and well hydrated  slightly uncomfortable but alert  EYES:  Right conjunctiva is not injected and without discharge.  Left conjunctiva is not injected and without discharge.   EARS: negative findings: external ears normal to inspection and palpation, no mastoid process tenderness, positive findings: , Right TM: serous middle ear fluid, Left TM: serous middle ear fluid  NOSE: negative findings: nose shows no deformity, asymmetry, or inflammation, positive findings: mucosa swollen, pale, and boggy,  Sinus maxillary tenderness on bilaterally.  THROAT: normal.  NECK: supple with no adenopathy,   CARDIAC:NORMAL - regular rate and rhythm without murmur.  RESP: normal respiratory rate and rhythm, lungs clear to auscultation  unlabored respirations, no intercostal retractions or accessory muscle use  ABD: Abdomen soft, non-tender.  SKIN: Skin color, texture, turgor normal. No rashes or lesions.  MS: extremities normal- no gross deformities noted, gait normal and normal muscle tone    Results for orders placed or performed in visit on 03/01/22   TSH with free T4 reflex      Status: Normal   Result Value Ref Range    TSH 2.91 0.40 - 4.00 mU/L   Comprehensive metabolic panel     Status: Abnormal   Result Value Ref Range    Sodium 135 133 - 144 mmol/L    Potassium 4.7 3.4 - 5.3 mmol/L    Chloride 102 94 - 109 mmol/L    Carbon Dioxide (CO2) 27 20 - 32 mmol/L    Anion Gap 6 3 - 14 mmol/L    Urea Nitrogen 14 7 - 30 mg/dL    Creatinine 0.59 0.52 - 1.04 mg/dL    Calcium 9.2 8.5 - 10.1 mg/dL    Glucose 334 (H) 70 - 99 mg/dL    Alkaline Phosphatase 94 40 - 150 U/L    AST 21 0 - 45 U/L    ALT 40 0 - 50 U/L    Protein Total 7.9 6.8 - 8.8 g/dL    Albumin 3.7 3.4 - 5.0 g/dL    Bilirubin Total 1.2 0.2 - 1.3 mg/dL    GFR Estimate >90 >60 mL/min/1.73m2   Lipid panel reflex to direct LDL Fasting     Status: Abnormal   Result Value Ref Range    Cholesterol 170 <200 mg/dL    Triglycerides 340 (H) <150 mg/dL    Direct Measure HDL 31 (L) >=50 mg/dL    LDL Cholesterol Calculated 71 <=100 mg/dL    Non HDL Cholesterol 139 (H) <130 mg/dL    Patient Fasting > 8hrs? Yes     Narrative    Cholesterol  Desirable:  <200 mg/dL    Triglycerides  Normal:  Less than 150 mg/dL  Borderline High:  150-199 mg/dL  High:  200-499 mg/dL  Very High:  Greater than or equal to 500 mg/dL    Direct Measure HDL  Female:  Greater than or equal to 50 mg/dL   Male:  Greater than or equal to 40 mg/dL    LDL Cholesterol  Desirable:  <100mg/dL  Above Desirable:  100-129 mg/dL   Borderline High:  130-159 mg/dL   High:  160-189 mg/dL   Very High:  >= 190 mg/dL    Non HDL Cholesterol  Desirable:  130 mg/dL  Above Desirable:  130-159 mg/dL  Borderline High:  160-189 mg/dL  High:  190-219 mg/dL  Very High:  Greater than or equal to 220 mg/dL   HEMOGLOBIN A1C     Status: Abnormal   Result Value Ref Range    Hemoglobin A1C 11.9 (H) 0.0 - 5.6 %   Albumin Random Urine Quantitative with Creat Ratio     Status: Abnormal   Result Value Ref Range    Creatinine Urine mg/dL 121 mg/dL    Albumin Urine mg/L 224 mg/L    Albumin Urine mg/g Cr 185.12 (H) 0.00  - 25.00 mg/g Cr     Assessment & Plan     Acute non-recurrent maxillary sinusitis  Discussed the nature and pathophysiology of sinusitis and treatment including the role of antibiotics and the need to get over the underlying trigger, URI or allergies.  Will Start:]  - amoxicillin-clavulanate (AUGMENTIN) 875-125 MG tablet  Dispense: 20 tablet; Refill: 0    Type 2 diabetes mellitus with hyperglycemia, without long-term current use of insulin (H)  Will Start:  - metFORMIN (GLUCOPHAGE-XR) 500 MG 24 hr tablet  Dispense: 180 tablet; Refill: 0  - AMB Adult Diabetes Educator Referral  - blood glucose monitoring (NO BRAND SPECIFIED) meter device kit  Dispense: 1 kit; Refill: 0  - blood glucose (NO BRAND SPECIFIED) test strip  Dispense: 100 strip; Refill: 6  - blood glucose calibration (NO BRAND SPECIFIED) solution  Dispense: 1 each; Refill: 11  - thin (NO BRAND SPECIFIED) lancets  Dispense: 100 each; Refill: 6  - alcohol swab prep pads  Dispense: 100 each; Refill: 3  glycohemoglobin monitoring discussed and long term diabetic complications discussed  Attempt to improve diet  Weight loss advised  Increased exercise advised  Medication changes as follows: Prescribing Metformin  1000 mg twice daily   Recheck in 3 months, sooner should new symptoms or   problems arise.    Morbid obesity (H)  Healthy diet and exercise reviewed.  Limit pop and juice intake.  Risks of obesity discussed.  Encourage exercise.  Limit TV/Computer/game time to one hour a day.  Screen thyroid   - TSH with free T4 reflex  -   CARDIOVASCULAR SCREENING; LDL GOAL LESS THAN 100  - Lipid panel reflex to direct LDL Fasting    Screening for thyroid disorder  - TSH with free T4 reflex    Microalbuminuria  Will Start:  - lisinopril (ZESTRIL) 10 MG tablet  Dispense: 90 tablet; Refill: 0  - AMB Adult Diabetes Educator Referral      Review of external notes as documented elsewhere in note  Ordering of each unique test  Prescription drug management   Time spent doing  chart review, history and exam, documentation and further activities per the note       See Patient Instructions  Patient Instructions     PLAN:   1.   Symptomatic therapy suggested: rest, increase fluids, apply heat to sinuses prn, use mist of vaporizer prn, OTC acetaminophen, ibuprofen, saline nasal spray as needed and call prn if symptoms persist or worsen.  2.  Orders Placed This Encounter   Medications     amoxicillin-clavulanate (AUGMENTIN) 875-125 MG tablet     Sig: Take 1 tablet by mouth 2 times daily     Dispense:  20 tablet     Refill:  0     Orders Placed This Encounter   Procedures     REVIEW OF HEALTH MAINTENANCE PROTOCOL ORDERS     Albumin Random Urine Quantitative with Creat Ratio     HEMOGLOBIN A1C     Lipid panel reflex to direct LDL Fasting     Comprehensive metabolic panel     TSH with free T4 reflex       3. Patient needs to follow up in if no improvement,or sooner if worsening of symptoms or other symptoms develop.  Work on weight loss  Regular exercise  Will follow up and/or notify patient of  results via My Chart to determine further need for followup  Schedule physical exam     A1c(%) Mean blood sugar (mg/dl)  6 135  7 170  8 205  9 240  10 275  11 310  12 345        Patient Education     Understanding Acute Rhinosinusitis  Acute rhinosinusitis is when the lining of the inside of the nose and the sinuses becomes irritated and swollen. It is also called sinusitis, or a sinus infection.  Sinuses are air-filled spaces in the skull behind the face. They are kept moist and clean by a lining of mucosa. Things such as pollen, smoke, and chemical fumes can irritate the mucosa. It can then swell up. As a response to irritation, the mucosa makes more mucus and other fluids. Tiny hairlike cilia cover the mucosa. Cilia help carry mucus toward the opening of the sinus. Too much mucus may cause the cilia to stop working. This blocks the sinus opening. A buildup of fluid in the sinuses then causes pain and  pressure. It can also cause bacteria to grow in the sinuses.    What causes acute rhinosinusitis?  A sinus infection is most often caused by a virus. You are more likely to get one after having a cold or the flu. In some cases, a sinus infection can be caused by bacteria.  You are at higher risk for a sinus infection if you:    Are older in age    Have structural problems with your sinuses    Smoke or are exposed to secondhand smoke    Are exposed to changes in pressure, such as from flying a lot or deep sea diving    Have asthma or allergies    Have a weak immune system    Have dental disease     Symptoms of acute rhinosinusitis  Symptoms of acute rhinosinusitis often last around 7 to 10 days. If you have a bacterial infection, they may last longer. They may also get better but then worsen. You may have:    Face pain or pressure under the eyes and around the nose    Headache    Fluid draining in the back of the throat (postnasal drip)    Congestion    Drainage that is thick and colored (often green), instead of clear    Cough    Problems with your sense of smell    Ear pain or hearing problems    Fever    Tooth pain    Fatigue  Diagnosing acute rhinosinusitis  Your healthcare provider will ask about your symptoms and past health. He or she will look at your ears, nose, throat, and sinuses. Imaging tests, such as X-rays, are often not needed.  It can be hard to figure out if a sinus infection is caused by a virus or bacterium. A bacterial infection tends to last longer. Symptoms may also get better but then worsen. Your healthcare provider may take a sample of mucus from your nose to check for bacteria.  Treating acute rhinosinusitis  Most sinus infections will go away within 10 days. Your body will fight off the virus. If your symptoms seem to get better but then worsen, you may have a bacterial infection instead. Your healthcare provider will then give you antibiotics. Take this medicine until it is gone, even if  you feel better.  To help ease your symptoms, your healthcare provider may advise:    Over-the-counter pain relievers. Medicines such as acetaminophen or ibuprofen can ease sinus pain. They may also lower a fever.    Nasal washes. Washing your nasal passages with salt water may ease pain and pressure. It can rinse out mucous and other irritants from your sinuses. Your healthcare provider can show you how to do it.    Nasal steroid spray. This prescription medicine can reduce inflammation in your sinuses.    Other medicines. Decongestants, antihistamines, and other nasal sprays may give short-term relief. They may help with congestion. Talk with your healthcare provider before taking these medicines.     Preventing acute rhinosinusitis  You can help prevent a sinus infection with these steps:    Wash your hands well and often.    Stay away from people who have a cold or upper respiratory infection.    Don't smoke. And stay away from secondhand smoke.    Use a humidifier at home.    Make sure you are up-to-date on your vaccines, such as the flu shot.     When to call your healthcare provider  Call your healthcare provider right away if you have any of these:    Fever of 100.4 F (38 C) or higher, or as directed by your healthcare provider    Pain that gets worse    Symptoms that don t get better, or get worse    New symptoms  [employer] last reviewed this educational content on 6/1/2019 2000-2021 [employer]. All rights reserved. This information is not intended as a substitute for professional medical care. Always follow your healthcare professional's instructions.                 No follow-ups on file.    JONI Oliver [facility]

## 2022-03-02 NOTE — RESULT ENCOUNTER NOTE
Robbin Singletno,    Attached are your test results.  -Triglycerides are elevated which can increase your heart disease risk.  A diet high in fat and simple carbohydrates, genetics and being overweight can contribute to this.  Your LDL(bad) cholesterol and HDL(good) cholesterol levels are normal.  ADVISE: exercising 150 minutes of aerobic exercise per week (30 minutes 5 days per week or 50 minutes 3 days per week are options), weight control, and omega-3 fatty acids (fish oil) daily are helpful to improve this.  In 12 months, you should recheck your fasting cholesterol panel by scheduling a lab-only appointment.  -Liver and gallbladder tests (ALT,AST, Alk phos,bilirubin) are normal.  -Kidney function (GFR) is normal.  -Sodium is normal.  -Potassium is normal.  -Calcium is normal.  -Glucose is elevated due to your diabetes.  -A1C test (average blood sugar the last 2-3 months) is very elevated    ADVISE: will make referral to diabetes educator and get you start back on your metformin   Also, you should recheck your A1C in 3 months.  -TSH (thyroid stimulating hormone) level is normal which indicates normal thyroid function.  -Microalbumin (urine protein) level is elevated. This is suggestive of early damage to your kidneys from high blood pressure and diabetes.  ADVISE: avoiding anti-inflamatory agents such as ibuprofen (Advil, Motrin) or naproxen (Aleve) as much as possible, keeping your blood pressure in a normal range, and starting treatment with an ACE inhibitor medication called lisinopril (lowers blood pressure and protects kidneys with an occasional side effect of a dry cough or lightheadedness).  I have sent a prescription to your pharmacy.  Please contact us if you have any questions.    Aysha Metcalf, CNP

## 2022-03-03 ENCOUNTER — TELEPHONE (OUTPATIENT)
Dept: FAMILY MEDICINE | Facility: CLINIC | Age: 37
End: 2022-03-03
Payer: COMMERCIAL

## 2022-03-03 NOTE — TELEPHONE ENCOUNTER
Patient called, she is requesting all of her prescriptions sent on 3/1/22 be sent to [facility] instead of [facility].   Discussed with the patient that she can call [facility] and they will transfer the medications for her.   She agrees to plan.  Removed [facility] from her pharmacy list.    Eufemia Kincaid RN, [facility]

## 2022-03-04 ENCOUNTER — TELEPHONE (OUTPATIENT)
Dept: FAMILY MEDICINE | Facility: CLINIC | Age: 37
End: 2022-03-04
Payer: COMMERCIAL

## 2022-03-11 ENCOUNTER — VIRTUAL VISIT (OUTPATIENT)
Dept: EDUCATION SERVICES | Facility: CLINIC | Age: 37
End: 2022-03-11
Attending: NURSE PRACTITIONER
Payer: COMMERCIAL

## 2022-03-11 DIAGNOSIS — E11.65 TYPE 2 DIABETES MELLITUS WITH HYPERGLYCEMIA, WITHOUT LONG-TERM CURRENT USE OF INSULIN (H): ICD-10-CM

## 2022-03-11 DIAGNOSIS — R80.9 MICROALBUMINURIA: ICD-10-CM

## 2022-03-11 PROCEDURE — G0108 DIAB MANAGE TRN  PER INDIV: HCPCS | Mod: TEL | Performed by: DIETITIAN, REGISTERED

## 2022-03-11 NOTE — LETTER
3/11/2022         RE: Lindsay Singleton  [address]  [facility] [address]        Dear Colleague,    Thank you for referring your patient, Lindsay Singleton, to the [facility]. Please see a copy of my visit note below.    Diabetes Self-Management Education & Support    Presents for: Individual review    Type of Visit: Telephone Visit    How would patient like to obtain AVS? MyChart    ASSESSMENT:  Lindsay has had diabetes since 2019. She states that she wasn't doing anything to manage her diabetes in the last few years. She states that her grandmother has recently had some complications from diabetes and this has motivated her to get her diabetes under control. She is not testing her BG, and is interested in the Letitia. She states that her insurance will cover it, but she is waiting for her insurance navigator to call her back to tell her how much it would be.     Today we discussed diabetes pathophysiology, healthy eating for diabetes. Recommended aiming for ~45 grams of carbohydrates. Recommended to balance plate with protein and non-starchy vegetables.     Patient's most recent   Lab Results   Component Value Date    A1C 11.9 03/01/2022    A1C 10.4 10/18/2019    is not meeting goal of <7.0    Diabetes knowledge and skills assessment:   Patient is knowledgeable in diabetes management concepts related to: none at this time, new dx    Continue education with the following diabetes management concepts: Healthy Eating, Being Active, Monitoring, Taking Medication, Problem Solving, Reducing Risks and Healthy Coping    Based on learning assessment above, most appropriate setting for further diabetes education would be: Individual setting.    INTERVENTIONS:    Education provided today on:  AADE Self-Care Behaviors:  Diabetes Pathophysiology  Healthy Eating: carbohydrate counting, consistency in amount, composition, and timing of food intake, weight reduction, portion control, plate planning  method and label reading  Monitoring: purpose, log and interpret results, individual blood glucose targets and frequency of monitoring    Opportunities for ongoing education and support in diabetes-self management were discussed. Pt verbalized understanding of concepts discussed and recommendations provided today.       Education Materials Provided:  [employer] Healthy Living with Diabetes Book, BG Log Sheet, Carbohydrate Counting and My Plate Planner      PLAN    Start monitoring BG- Lindsay will reach out once she hears from insurance on coverage of Letitia    Topics to cover at upcoming visits: Healthy Eating, Being Active, Monitoring, Taking Medication, Problem Solving, Reducing Risks and Healthy Coping  Follow-up: 4/14    See Goals Section for co-developed, patient-stated behavior change goals.  AVS provided to patient today.          SUBJECTIVE / OBJECTIVE:  Presents for: Individual review  Accompanied by: Self  Diabetes education in the past 24mo: No  Diabetes type: Type 2  Date of diagnosis: 2019  Disease course: Worsening  Diabetes management related comments/concerns: Grandma has type 2 diabetes, seeing the complications. Wants to make sure she is buying the right foods at the grocery store.  Difficulty affording diabetes medication?: No  Difficulty affording diabetes testing supplies?: No  Cultural Influences/Ethnic Background:  Choose not to answer    Diabetes Symptoms & Complications:  Fatigue: No  Neuropathy: No  Polydipsia: Yes  Polyphagia: No  Polyuria: Sometimes  Visual change: Sometimes  Slow healing wounds: No  Symptom course: Stable  Weight trend: Decreasing  Complications assessed today?: Yes    Patient Problem List and Family Medical History reviewed for relevant medical history, current medical status, and diabetes risk factors.    Vitals:  There were no vitals taken for this visit.  Estimated body mass index is 48.7 kg/m  as calculated from the following:    Height as of 10/25/19: 1.683 m  "(5' 6.25\").    Weight as of 3/1/22: 137.9 kg (304 lb).   Last 3 BP:   BP Readings from Last 3 Encounters:   03/01/22 (!) 144/92   10/25/19 136/88   10/18/19 128/86       History   Smoking Status     Never Smoker   Smokeless Tobacco     Never Used       Labs:  Lab Results   Component Value Date    A1C 11.9 03/01/2022    A1C 10.4 10/18/2019     Lab Results   Component Value Date     03/01/2022     10/18/2019     Lab Results   Component Value Date    LDL 71 03/01/2022     10/18/2019     HDL Cholesterol   Date Value Ref Range Status   10/18/2019 32 (L) >49 mg/dL Final     Direct Measure HDL   Date Value Ref Range Status   03/01/2022 31 (L) >=50 mg/dL Final   ]  GFR Estimate   Date Value Ref Range Status   03/01/2022 >90 >60 mL/min/1.73m2 Final     Comment:     Effective December 21, 2021 eGFRcr in adults is calculated using the 2021 CKD-EPI creatinine equation which includes age and gender (Fuad et al., NEJM, DOI: 10.1056/YWNGcd8607883)   10/18/2019 >90 >60 mL/min/[1.73_m2] Final     Comment:     Non  GFR Calc  Starting 12/18/2018, serum creatinine based estimated GFR (eGFR) will be   calculated using the Chronic Kidney Disease Epidemiology Collaboration   (CKD-EPI) equation.       GFR Estimate If Black   Date Value Ref Range Status   10/18/2019 >90 >60 mL/min/[1.73_m2] Final     Comment:      GFR Calc  Starting 12/18/2018, serum creatinine based estimated GFR (eGFR) will be   calculated using the Chronic Kidney Disease Epidemiology Collaboration   (CKD-EPI) equation.       Lab Results   Component Value Date    CR 0.59 03/01/2022    CR 0.48 10/18/2019     No results found for: MICROALBUMIN    Healthy Eating:  Healthy Eating Assessed Today: Yes  Cultural/[employer] diet restrictions?: No  Meals include: Breakfast, Lunch, Dinner  Breakfast: Often skips OR will have eggs and james or sausage sometimes with yogurt  Lunch: [place] (keto bread, turkey, cheese, salazar or " hayder) OR turkey Syed from [employer]  Dinner: [employer] meal -usually chicken with greens and rice  Snacks: keto friendly snacks, late night snacking - popcorn  Other: Doing weight watchers  Beverages: Water, Diet soda, Coffee, Tea  Has patient met with a dietitian in the past?: No    Being Active:  Being Active Assessed Today: Yes  Exercise:: Currently not exercising (walking outside when it is nice out, walking to [place] OR walking to [employer] during lunch)    Monitoring:  Monitoring Assessed Today: Yes  Did patient bring glucose meter to appointment? : No (Not testing yet)    Taking Medications:  Diabetes Medication(s)     Biguanides       metFORMIN (GLUCOPHAGE-XR) 500 MG 24 hr tablet    Take 1 tablet (500 mg) by mouth 2 times daily (with meals)          Taking Medication Assessed Today: Yes  Current Treatments: Oral Medication (taken by mouth)  Problems taking diabetes medications regularly?: No  Diabetes medication side effects?: No    Problem Solving:  Problem Solving Assessed Today: Yes  Is the patient at risk for hypoglycemia?: No  Is the patient at risk for DKA?: No              Reducing Risks:  Reducing Risks Assessed Today: Yes  Diabetes Risks: Sedentary Lifestyle, Family History  CAD Risks: Diabetes Mellitus, Family history, Obesity, Dyslipidemia, Sedentary lifestyle  Has dilated eye exam at least once a year?: No (Hasn't been since 2020)  Sees dentist every 6 months?: Yes  Feet checked by healthcare provider in the last year?: Yes    Healthy Coping:  Healthy Coping Assessed Today: Yes  Emotional response to diabetes: Acceptance, Ready to learn  Informal Support system:: None  Stage of change: PREPARATION (Decided to change - considering how)  Support resources: None  Patient Activation Measure Survey Score:  EDINSON Score (Last Two) 9/4/2019   EDINSON Raw Score 31   Activation Score 59.3   EDINSON Level 3       Cari Pappas, RD, LD, CDE  Time Spent: 49 minutes  Encounter Type: Individual    Any diabetes  medication dose changes were made via the Certified Diabetes Care & Education Protocol in collaboration with the patient's referring provider. A copy of this encounter was shared with the provider.

## 2022-07-11 ENCOUNTER — VIRTUAL VISIT (OUTPATIENT)
Dept: FAMILY MEDICINE | Facility: CLINIC | Age: 37
End: 2022-07-11
Payer: COMMERCIAL

## 2022-07-11 DIAGNOSIS — E66.01 MORBID OBESITY (H): ICD-10-CM

## 2022-07-11 DIAGNOSIS — E11.65 TYPE 2 DIABETES MELLITUS WITH HYPERGLYCEMIA, WITHOUT LONG-TERM CURRENT USE OF INSULIN (H): Primary | ICD-10-CM

## 2022-07-11 PROCEDURE — 99213 OFFICE O/P EST LOW 20 MIN: CPT | Mod: GT | Performed by: NURSE PRACTITIONER

## 2022-07-11 RX ORDER — METFORMIN HCL 500 MG
1000 TABLET, EXTENDED RELEASE 24 HR ORAL 2 TIMES DAILY WITH MEALS
Qty: 360 TABLET | Refills: 3 | Status: SHIPPED | OUTPATIENT
Start: 2022-07-11 | End: 2023-10-02

## 2022-07-11 NOTE — PROGRESS NOTES
Lindsay is a 36 year old who is being evaluated via a billable video visit.      How would you like to obtain your AVS? MyChart  If the video visit is dropped, the invitation should be resent by: Text to cell phone: [phone]  Will anyone else be joining your video visit? No        Subjective   Lindsay is a 36 year old, presenting for the following health issues:  Recheck Medication      History of Present Illness       Diabetes:   She presents for follow up of diabetes.  She is checking home blood glucose with a continuous glucose monitor.  She checks blood glucose before and after meals and at bedtime.  Blood glucose is sometimes over 200 and never under 70. When her blood glucose is low, the patient is asymptomatic for confusion, blurred vision, lethargy and reports not feeling dizzy, shaky, or weak.  She is concerned about blood sugar frequently over 200. She is having excessive thirst. The patient has not had a diabetic eye exam in the last 12 months.         She eats 2-3 servings of fruits and vegetables daily.She consumes 0 sweetened beverage(s) daily.She exercises with enough effort to increase her heart rate 20 to 29 minutes per day.  She exercises with enough effort to increase her heart rate 3 or less days per week. She is missing 1 dose(s) of medications per week.  She is not taking prescribed medications regularly due to remembering to take.       Diabetes Follow-up    How often are you checking your blood sugar? One time daily  What time of day are you checking your blood sugars (select all that apply)?  Before meals  Have you had any blood sugars above 200?  Yes   Have you had any blood sugars below 70?  No    What symptoms do you notice when your blood sugar is low?  None    What concerns do you have today about your diabetes? Blood sugar is often over 200     Do you have any of these symptoms? (Select all that apply)  No numbness or tingling in feet.  No redness, sores or blisters on feet.  No  complaints of excessive thirst.  No reports of blurry vision.  No significant changes to weight.    Have you had a diabetic eye exam in the last 12 months? No    Has been tolerating the metformin OK at 500 mg twice a day   Joined a program called through [employer] and for people with Type 2 diabetes   Got a continuous glucose monitor and a Fit bit with this program   It gives her immediate feedback and is so much beneficial   Is also doing intermittent fasting     BP Readings from Last 2 Encounters:   03/01/22 (!) 144/92   10/25/19 136/88     Hemoglobin A1C POCT (%)   Date Value   10/18/2019 10.4 (H)     Hemoglobin A1C (%)   Date Value   07/18/2022 9.1 (H)   03/01/2022 11.9 (H)     LDL Cholesterol Calculated (mg/dL)   Date Value   03/01/2022 71   10/18/2019 100 (H)                 Labs reviewed in EPIC  BP Readings from Last 3 Encounters:   03/01/22 (!) 144/92   10/25/19 136/88   10/18/19 128/86    Wt Readings from Last 3 Encounters:   03/01/22 137.9 kg (304 lb)   10/25/19 (!) 153.8 kg (339 lb)   10/18/19 (!) 155.1 kg (341 lb 14.4 oz)              Patient Active Problem List   Diagnosis     Morbid obesity (H)     Diabetes mellitus, type 2 (H)     Past Surgical History:   Procedure Laterality Date     NO HISTORY OF SURGERY         Social History     Tobacco Use     Smoking status: Never Smoker     Smokeless tobacco: Never Used   Substance Use Topics     Alcohol use: Yes     Comment: occ.      Family History   Problem Relation Age of Onset     Diabetes Paternal Grandmother         late 50s      Unknown/Adopted Mother      Cerebrovascular Disease Paternal Grandfather         78      Colon Cancer No family hx of      Coronary Artery Disease No family hx of      Breast Cancer No family hx of          Current Outpatient Medications   Medication Sig Dispense Refill     lisinopril (ZESTRIL) 10 MG tablet Take 1 tablet (10 mg) by mouth daily 90 tablet 0     metFORMIN (GLUCOPHAGE XR) 500 MG 24 hr tablet Take 2 tablets (1,000 mg)  "by mouth 2 times daily (with meals) 360 tablet 3     Allergies   Allergen Reactions     Septra [Sulfamethoxazole W/Trimethoprim] Swelling and Rash     Recent Labs   Lab Test 07/18/22  1405 03/01/22  0910 10/18/19  0907   A1C 9.1* 11.9* 10.4*   LDL  --  71 100*   HDL  --  31* 32*   TRIG  --  340* 194*   ALT  --  40 101*   CR  --  0.59 0.48*   GFRESTIMATED  --  >90 >90   GFRESTBLACK  --   --  >90   POTASSIUM  --  4.7 4.1   TSH  --  2.91 3.71        '  Review of Systems   Constitutional, HEENT, cardiovascular, pulmonary, gi and gu systems are negative, except as otherwise noted.      Objective           Vitals:  No vitals were obtained today due to virtual visit.  BP Readings from Last 1 Encounters:   03/01/22 (!) 144/92     Pulse Readings from Last 1 Encounters:   03/01/22 97     Wt Readings from Last 1 Encounters:   03/01/22 137.9 kg (304 lb)     Ht Readings from Last 1 Encounters:   10/25/19 1.683 m (5' 6.25\")     Estimated body mass index is 48.7 kg/m  as calculated from the following:    Height as of 10/25/19: 1.683 m (5' 6.25\").    Weight as of 3/1/22: 137.9 kg (304 lb).    Temp Readings from Last 1 Encounters:   03/01/22 98.2  F (36.8  C) (Tympanic)       Physical Exam   GENERAL: alert, no distress and obese  EYES: Eyes grossly normal to inspection.  No discharge or erythema, or obvious scleral/conjunctival abnormalities.  HENT: normal cephalic/atraumatic and oral mucous membranes moist  RESP: No audible wheeze, cough, or visible cyanosis.  No visible retractions or increased work of breathing.    MS: extremities normal- no gross deformities noted  SKIN: Visible skin clear. No significant rash, abnormal pigmentation or lesions.  NEURO: mentation intact  PSYCH: Mentation appears normal, affect normal/bright, judgement and insight intact, normal speech and appearance well-groomed.    Diagnostic Test Results:   Diagnostic Test Results:  Labs reviewed in Epic  Office Visit on 03/01/2022   Component Date Value Ref " Range Status     TSH 03/01/2022 2.91  0.40 - 4.00 mU/L Final     Sodium 03/01/2022 135  133 - 144 mmol/L Final     Potassium 03/01/2022 4.7  3.4 - 5.3 mmol/L Final     Chloride 03/01/2022 102  94 - 109 mmol/L Final     Carbon Dioxide (CO2) 03/01/2022 27  20 - 32 mmol/L Final     Anion Gap 03/01/2022 6  3 - 14 mmol/L Final     Urea Nitrogen 03/01/2022 14  7 - 30 mg/dL Final     Creatinine 03/01/2022 0.59  0.52 - 1.04 mg/dL Final     Calcium 03/01/2022 9.2  8.5 - 10.1 mg/dL Final     Glucose 03/01/2022 334 (A) 70 - 99 mg/dL Final     Alkaline Phosphatase 03/01/2022 94  40 - 150 U/L Final     AST 03/01/2022 21  0 - 45 U/L Final     ALT 03/01/2022 40  0 - 50 U/L Final     Protein Total 03/01/2022 7.9  6.8 - 8.8 g/dL Final     Albumin 03/01/2022 3.7  3.4 - 5.0 g/dL Final     Bilirubin Total 03/01/2022 1.2  0.2 - 1.3 mg/dL Final     GFR Estimate 03/01/2022 >90  >60 mL/min/1.73m2 Final    Effective December 21, 2021 eGFRcr in adults is calculated using the 2021 CKD-EPI creatinine equation which includes age and gender (Fuad et al., NEJ, DOI: 10.1056/ROFCmm1124714)     Cholesterol 03/01/2022 170  <200 mg/dL Final     Triglycerides 03/01/2022 340 (A) <150 mg/dL Final     Direct Measure HDL 03/01/2022 31 (A) >=50 mg/dL Final     LDL Cholesterol Calculated 03/01/2022 71  <=100 mg/dL Final     Non HDL Cholesterol 03/01/2022 139 (A) <130 mg/dL Final     Patient Fasting > 8hrs? 03/01/2022 Yes   Final     Hemoglobin A1C 03/01/2022 11.9 (A) 0.0 - 5.6 % Final    Normal <5.7%   Prediabetes 5.7-6.4%    Diabetes 6.5% or higher     Note: Adopted from ADA consensus guidelines.     Creatinine Urine mg/dL 03/01/2022 121  mg/dL Final     Albumin Urine mg/L 03/01/2022 224  mg/L Final     Albumin Urine mg/g Cr 03/01/2022 185.12 (A) 0.00 - 25.00 mg/g Cr Final       Assessment & Plan     Type 2 diabetes mellitus with hyperglycemia, without long-term current use of insulin (H)  foot care discussed and Podiatry visits discussed, annual eye  examinations at Ophthalmology discussed and glycohemoglobin monitoring discussed  Attempt to improve diet  Weight loss advised  Increased exercise advised  Medication changes as follows: Prescribing Metformin  1000 mg twice daily   Recheck in 3 months, sooner should new symptoms or   problems arise.  - Basic metabolic panel  - metFORMIN (GLUCOPHAGE XR) 500 MG 24 hr tablet  Dispense: 360 tablet; Refill: 3  - Hemoglobin A1c    Morbid obesity (H)  Healthy diet and exercise reviewed.  Limit pop and juice intake.  Risks of obesity discussed.  Encourage exercise.  Limit TV/Computer/game time to one hour a day.    Ordering of each unique test  Prescription drug management   Time spent doing chart review, history and exam, documentation and further activities per the note       See Patient Instructions  Patient Instructions     PLAN:   1.   Symptomatic therapy suggested: will increase metformin 1000 mg twice a day.  2.  Orders Placed This Encounter   Medications     metFORMIN (GLUCOPHAGE XR) 500 MG 24 hr tablet     Sig: Take 2 tablets (1,000 mg) by mouth 2 times daily (with meals)     Dispense:  360 tablet     Refill:  3     Orders Placed This Encounter   Procedures     Basic metabolic panel     Hemoglobin A1c       3. Patient needs to follow up in if no improvement,or sooner if worsening of symptoms or other symptoms develop.  FUTURE LABS:       - Schedule a non-fasting blood draw   Will follow up and/or notify patient of  results via My Chart to determine further need for followup  Follow up in 3 months.  Schedule physical exam         Return in about 3 months (around 10/11/2022), or if symptoms worsen or fail to improve, for Schedule physical.    [facility]          Video-Visit Details    Video Start Time: 12:55 pm     Type of service:  Video Visit    Video End Time:1:16 PM    Originating Location (pt. Location): Home    Distant Location (provider location):    [facility]     Platform used for Video Visit: Humberto Rosas.

## 2022-07-11 NOTE — PATIENT INSTRUCTIONS
PLAN:   1.   Symptomatic therapy suggested: will increase metformin 1000 mg twice a day.  2.  Orders Placed This Encounter   Medications    metFORMIN (GLUCOPHAGE XR) 500 MG 24 hr tablet     Sig: Take 2 tablets (1,000 mg) by mouth 2 times daily (with meals)     Dispense:  360 tablet     Refill:  3     Orders Placed This Encounter   Procedures    Basic metabolic panel    Hemoglobin A1c       3. Patient needs to follow up in if no improvement,or sooner if worsening of symptoms or other symptoms develop.  FUTURE LABS:       - Schedule a non-fasting blood draw   Will follow up and/or notify patient of  results via My Chart to determine further need for followup  Follow up in 3 months.  Schedule physical exam

## 2022-07-16 ENCOUNTER — HEALTH MAINTENANCE LETTER (OUTPATIENT)
Age: 37
End: 2022-07-16

## 2022-07-18 ENCOUNTER — LAB (OUTPATIENT)
Dept: LAB | Facility: CLINIC | Age: 37
End: 2022-07-18
Payer: COMMERCIAL

## 2022-07-18 DIAGNOSIS — E11.65 TYPE 2 DIABETES MELLITUS WITH HYPERGLYCEMIA, WITHOUT LONG-TERM CURRENT USE OF INSULIN (H): ICD-10-CM

## 2022-07-18 LAB — HBA1C MFR BLD: 9.1 % (ref 0–5.6)

## 2022-07-18 PROCEDURE — 36415 COLL VENOUS BLD VENIPUNCTURE: CPT

## 2022-07-18 PROCEDURE — 80048 BASIC METABOLIC PNL TOTAL CA: CPT

## 2022-07-18 PROCEDURE — 83036 HEMOGLOBIN GLYCOSYLATED A1C: CPT

## 2022-07-19 LAB
ANION GAP SERPL CALCULATED.3IONS-SCNC: 9 MMOL/L (ref 3–14)
BUN SERPL-MCNC: 16 MG/DL (ref 7–30)
CALCIUM SERPL-MCNC: 9.4 MG/DL (ref 8.5–10.1)
CHLORIDE BLD-SCNC: 108 MMOL/L (ref 94–109)
CO2 SERPL-SCNC: 21 MMOL/L (ref 20–32)
CREAT SERPL-MCNC: 0.57 MG/DL (ref 0.52–1.04)
GFR SERPL CREATININE-BSD FRML MDRD: >90 ML/MIN/1.73M2
GLUCOSE BLD-MCNC: 119 MG/DL (ref 70–99)
POTASSIUM BLD-SCNC: 4.1 MMOL/L (ref 3.4–5.3)
SODIUM SERPL-SCNC: 138 MMOL/L (ref 133–144)

## 2022-07-21 NOTE — RESULT ENCOUNTER NOTE
Robbin Singleton,    Attached are your test results.  -A1C (test of diabetes control the last 2-3 months) improved and closer to your goal. Please recheck your A1C test in 3 months with 2 metformin twice a day and all the work you have done with your diet    Please contact us if you have any questions.    Aysha Metcalf, CNP

## 2022-09-18 ENCOUNTER — HEALTH MAINTENANCE LETTER (OUTPATIENT)
Age: 37
End: 2022-09-18

## 2023-01-29 ENCOUNTER — HEALTH MAINTENANCE LETTER (OUTPATIENT)
Age: 38
End: 2023-01-29

## 2023-05-07 ENCOUNTER — HEALTH MAINTENANCE LETTER (OUTPATIENT)
Age: 38
End: 2023-05-07

## 2023-05-11 ENCOUNTER — TELEPHONE (OUTPATIENT)
Dept: FAMILY MEDICINE | Facility: CLINIC | Age: 38
End: 2023-05-11
Payer: COMMERCIAL

## 2023-05-11 NOTE — TELEPHONE ENCOUNTER
Patient Quality Outreach    Patient is due for the following:   Diabetes -  A1C, LDL (Fasting), Eye Exam, Microalbumin and Foot Exam  Physical Annual Wellness Visit      Topic Date Due     Hepatitis B Vaccine (1 of 3 - 3-dose series) Never done     Diptheria Tetanus Pertussis (DTAP/TDAP/TD) Vaccine (1 - Tdap) Never done     COVID-19 Vaccine (4 - Pfizer series) 02/14/2022     Flu Vaccine (1) 09/01/2022       Next Steps:   Schedule a Adult Preventative    Type of outreach:    Sent [employer] message.      Questions for provider review:    None           Diane L. Schoenherr, RN

## 2023-10-02 ENCOUNTER — OFFICE VISIT (OUTPATIENT)
Dept: FAMILY MEDICINE | Facility: CLINIC | Age: 38
End: 2023-10-02
Payer: COMMERCIAL

## 2023-10-02 VITALS
HEART RATE: 80 BPM | BODY MASS INDEX: 45.99 KG/M2 | DIASTOLIC BLOOD PRESSURE: 99 MMHG | HEIGHT: 67 IN | TEMPERATURE: 97.2 F | RESPIRATION RATE: 18 BRPM | WEIGHT: 293 LBS | OXYGEN SATURATION: 97 % | SYSTOLIC BLOOD PRESSURE: 142 MMHG

## 2023-10-02 DIAGNOSIS — E11.65 TYPE 2 DIABETES MELLITUS WITH HYPERGLYCEMIA, WITHOUT LONG-TERM CURRENT USE OF INSULIN (H): ICD-10-CM

## 2023-10-02 DIAGNOSIS — R80.9 MICROALBUMINURIA: ICD-10-CM

## 2023-10-02 DIAGNOSIS — Z13.220 SCREENING CHOLESTEROL LEVEL: ICD-10-CM

## 2023-10-02 DIAGNOSIS — E66.01 MORBID OBESITY (H): Primary | ICD-10-CM

## 2023-10-02 DIAGNOSIS — Z23 NEED FOR PROPHYLACTIC VACCINATION AND INOCULATION AGAINST INFLUENZA: ICD-10-CM

## 2023-10-02 PROCEDURE — 90686 IIV4 VACC NO PRSV 0.5 ML IM: CPT | Performed by: FAMILY MEDICINE

## 2023-10-02 PROCEDURE — 90471 IMMUNIZATION ADMIN: CPT | Performed by: FAMILY MEDICINE

## 2023-10-02 PROCEDURE — 99214 OFFICE O/P EST MOD 30 MIN: CPT | Mod: 25 | Performed by: FAMILY MEDICINE

## 2023-10-02 PROCEDURE — 90472 IMMUNIZATION ADMIN EACH ADD: CPT | Performed by: FAMILY MEDICINE

## 2023-10-02 PROCEDURE — 91320 SARSCV2 VAC 30MCG TRS-SUC IM: CPT | Performed by: FAMILY MEDICINE

## 2023-10-02 PROCEDURE — 90480 ADMN SARSCOV2 VAC 1/ONLY CMP: CPT | Performed by: FAMILY MEDICINE

## 2023-10-02 PROCEDURE — 90715 TDAP VACCINE 7 YRS/> IM: CPT | Performed by: FAMILY MEDICINE

## 2023-10-02 RX ORDER — METFORMIN HCL 500 MG
1000 TABLET, EXTENDED RELEASE 24 HR ORAL 2 TIMES DAILY WITH MEALS
Qty: 360 TABLET | Refills: 3 | Status: SHIPPED | OUTPATIENT
Start: 2023-10-02 | End: 2024-04-10

## 2023-10-02 RX ORDER — LISINOPRIL 10 MG/1
10 TABLET ORAL DAILY
Qty: 90 TABLET | Refills: 3 | Status: SHIPPED | OUTPATIENT
Start: 2023-10-02 | End: 2024-06-13

## 2023-10-02 ASSESSMENT — PAIN SCALES - GENERAL: PAINLEVEL: NO PAIN (0)

## 2023-10-02 NOTE — NURSING NOTE
Prior to immunization administration, verified patients identity using patient s name and date of birth. Please see Immunization Activity for additional information.     Screening Questionnaire for Adult Immunization    Are you sick today?   No   Do you have allergies to medications, food, a vaccine component or latex?   No   Have you ever had a serious reaction after receiving a vaccination?   No   Do you have a long-term health problem with heart, lung, kidney, or metabolic disease (e.g., diabetes), asthma, a blood disorder, no spleen, complement component deficiency, a cochlear implant, or a spinal fluid leak?  Are you on long-term aspirin therapy?   No   Do you have cancer, leukemia, HIV/AIDS, or any other immune system problem?   No   Do you have a parent, brother, or sister with an immune system problem?   No   In the past 3 months, have you taken medications that affect  your immune system, such as prednisone, other steroids, or anticancer drugs; drugs for the treatment of rheumatoid arthritis, Crohn s disease, or psoriasis; or have you had radiation treatments?   No   Have you had a seizure, or a brain or other nervous system problem?   No   During the past year, have you received a transfusion of blood or blood    products, or been given immune (gamma) globulin or antiviral drug?   No   For women: Are you pregnant or is there a chance you could become       pregnant during the next month?   No   Have you received any vaccinations in the past 4 weeks?   No     Immunization questionnaire answers were all negative.      Patient instructed to remain in clinic for 15 minutes afterwards, and to report any adverse reactions.     Screening performed by Samantha Mayberry MA on 10/2/2023 at 11:10 AM.

## 2023-10-02 NOTE — PATIENT INSTRUCTIONS
Consider scheduling with [facility] for evaluation of eating.     Early morning cortisol level is recommended to rule out Cushings disease

## 2023-10-02 NOTE — PROGRESS NOTES
Assessment & Plan     Morbid obesity (H)  Long history of obesity with many failed attempts at weight loss.  Struggles with volume of food intake control and does have some binge eating patterns.  We discussed importance of addressing the binge eating and I encouraged her to follow-up with the [facility].  We discussed how to get that appointment scheduled.  Certainly, it would be appropriate to add on GLP-1 agonist for her poorly controlled diabetes at this time which should also help her appetite management  - Cortisol; Future    Type 2 diabetes mellitus with hyperglycemia, without long-term current use of insulin (H)  Uncontrolled.  Has been off her metformin for several months.  Recommend restart of metformin and will start Ozempic. Reviewed timing of taking, onset, benefits, monitoring and typicall and severe AE of the medication.  She will start CGM and follow-up with her nutritional help through work.  We discussed further medication may be needed to get her diabetes under better control and close follow-up recommended.  - HEMOGLOBIN A1C; Future  - Lipid panel reflex to direct LDL Non-fasting; Future  - Albumin Random Urine Quantitative with Creat Ratio; Future  - BASIC METABOLIC PANEL; Future  - metFORMIN (GLUCOPHAGE XR) 500 MG 24 hr tablet; Take 2 tablets (1,000 mg) by mouth 2 times daily (with meals)  - TSH with free T4 reflex; Future  - semaglutide (OZEMPIC) 2 MG/3ML pen; Inject 0.25 mg Subcutaneous every 7 days May increase dose to 0.5 mg after 4 weeks.    Microalbuminuria  Has been noted in the past.  Restart ACE inhibitor especially given elevated blood pressure today  - lisinopril (ZESTRIL) 10 MG tablet; Take 1 tablet (10 mg) by mouth daily    Need for prophylactic vaccination and inoculation against influenza  Flu shot updated.  COVID-19 and Tdap vaccination also given today.    Screening cholesterol level  - Lipid panel reflex to direct LDL Non-fasting; Future    She will schedule nonfasting  "lab visit in 3 weeks.      I spent a total of 41 minutes on the day of the visit.   Time spent by me doing chart review, history and exam, documentation and further activities per the note       BMI:   Estimated body mass index is 46.74 kg/m  as calculated from the following:    Height as of this encounter: 1.689 m (5' 6.5\").    Weight as of this encounter: 133.4 kg (294 lb).   Weight management plan: See above      Patient Instructions   Consider scheduling with [facility] for evaluation of eating.     Early morning cortisol level is recommended to rule out Cushings disease    Follow-up in 2 months with PCP planned    Micaela Padilla MD  [facility]    Jerica Montoya is a 38 year old, presenting for the following health issues:  Medication Request (Refill for metformin and discuss ozempic medication for weight loss )         No data to display                History of Present Illness       Diabetes:   She presents for follow up of diabetes.    She is not checking blood glucose.        She is concerned about blood sugar frequently over 200.   She is having burning in feet.  The patient has not had a diabetic eye exam in the last 12 months.          She eats 2-3 servings of fruits and vegetables daily.She consumes 0 sweetened beverage(s) daily.She exercises with enough effort to increase her heart rate 9 or less minutes per day.  She exercises with enough effort to increase her heart rate 3 or less days per week.   She is taking medications regularly.     Lindsay is a new patient to me.  She reports she has been obese for most of her life  Has tried ww and many other programs for weight loss  Appetite and portion control, essentially volume are her issues.  Binge at times  Hungry all the time.   She is interested in starting Ozempic.  Has a family member who has been on this with some good success.    Gma with dm2  Parents are not overweight  Not taking her DM 2 diagnosis " "seriously (dx 2019) but knows she needs to do this.   Ran out of metformin in June or July  Stria abd and upper arms.     No fatigue  Melatonin for sleep to help her sleep at night with recent Work related stress.      Age 19 anxiety and depression and no issues since then until very recently with hectic work schedule.     Has program through work where can get nutritional support/diabetes support and getting cgm this week through work.     Condoms for contraception. .     Done hep b vaccines in past.     Has also not been taking her lisinopril recently  Review of Systems   Constitutional, HEENT, cardiovascular, pulmonary, gi and gu systems are negative, except as otherwise noted.      Objective    BP (!) 146/88 (BP Location: Right arm, Patient Position: Sitting, Cuff Size: Adult Large)   Pulse 80   Temp 97.2  F (36.2  C) (Temporal)   Resp 18   Ht 1.689 m (5' 6.5\")   Wt 133.4 kg (294 lb)   LMP 09/16/2023 (Exact Date)   SpO2 97%   BMI 46.74 kg/m    Body mass index is 46.74 kg/m .  Physical Exam   GENERAL: healthy, alert and no distress  NECK: no adenopathy, no asymmetry, masses, or scars and thyroid normal to palpation  RESP: lungs clear to auscultation - no rales, rhonchi or wheezes  CV: regular rate and rhythm, normal S1 S2, no S3 or S4, no murmur, click or rub, no peripheral edema and peripheral pulses strong  MS: no gross musculoskeletal defects noted, no edema  PSYCH: mentation appears normal, affect normal/bright    Component      Latest Ref Rng 3/1/2022  9:10 AM 3/1/2022  9:19 AM   Sodium      133 - 144 mmol/L 135     Potassium      3.4 - 5.3 mmol/L 4.7     Chloride      94 - 109 mmol/L 102     Carbon Dioxide      20 - 32 mmol/L 27     Anion Gap      3 - 14 mmol/L 6     Urea Nitrogen      7 - 30 mg/dL 14     Creatinine      0.52 - 1.04 mg/dL 0.59     Calcium      8.5 - 10.1 mg/dL 9.2     Glucose      70 - 99 mg/dL 334 (H)     Alkaline Phosphatase      40 - 150 U/L 94     AST      0 - 45 U/L 21   "   ALT      0 - 50 U/L 40     Protein Total      6.8 - 8.8 g/dL 7.9     Albumin      3.4 - 5.0 g/dL 3.7     Bilirubin Total      0.2 - 1.3 mg/dL 1.2     GFR Estimate      >60 mL/min/1.73m2 >90     Cholesterol      <200 mg/dL 170     Triglycerides      <150 mg/dL 340 (H)     HDL Cholesterol      >=50 mg/dL 31 (L)     LDL Cholesterol Calculated      <=100 mg/dL 71     Non HDL Cholesterol      <130 mg/dL 139 (H)     Patient Fasting? Yes     Creatinine Urine      mg/dL  121    Albumin Urine mg/L      mg/L  224    Albumin Urine mg/g Cr      0.00 - 25.00 mg/g Cr  185.12 (H)    TSH      0.40 - 4.00 mU/L 2.91     Hemoglobin A1C      0.0 - 5.6 % 11.9 (H)       Component      Latest Ref [facility] 7/18/2022  2:05 PM   Sodium      133 - 144 mmol/L 138    Potassium      3.4 - 5.3 mmol/L 4.1    Chloride      94 - 109 mmol/L 108    Carbon Dioxide      20 - 32 mmol/L 21    Anion Gap      3 - 14 mmol/L 9    Urea Nitrogen      7 - 30 mg/dL 16    Creatinine      0.52 - 1.04 mg/dL 0.57    Calcium      8.5 - 10.1 mg/dL 9.4    Glucose      70 - 99 mg/dL 119 (H)    Alkaline Phosphatase      40 - 150 U/L    AST      0 - 45 U/L    ALT      0 - 50 U/L    Protein Total      6.8 - 8.8 g/dL    Albumin      3.4 - 5.0 g/dL    Bilirubin Total      0.2 - 1.3 mg/dL    GFR Estimate      >60 mL/min/1.73m2 >90    Cholesterol      <200 mg/dL    Triglycerides      <150 mg/dL    HDL Cholesterol      >=50 mg/dL    LDL Cholesterol Calculated      <=100 mg/dL    Non HDL Cholesterol      <130 mg/dL    Patient Fasting?    Creatinine Urine      mg/dL    Albumin Urine mg/L      mg/L    Albumin Urine mg/g Cr      0.00 - 25.00 mg/g Cr    TSH      0.40 - 4.00 mU/L    Hemoglobin A1C      0.0 - 5.6 % 9.1 (H)       Legend:  (H) High  (L) Low

## 2023-10-08 ENCOUNTER — HEALTH MAINTENANCE LETTER (OUTPATIENT)
Age: 38
End: 2023-10-08

## 2023-11-04 ENCOUNTER — LAB (OUTPATIENT)
Dept: LAB | Facility: CLINIC | Age: 38
End: 2023-11-04
Payer: COMMERCIAL

## 2023-11-04 DIAGNOSIS — E11.65 TYPE 2 DIABETES MELLITUS WITH HYPERGLYCEMIA, WITHOUT LONG-TERM CURRENT USE OF INSULIN (H): ICD-10-CM

## 2023-11-04 DIAGNOSIS — E66.01 MORBID OBESITY (H): ICD-10-CM

## 2023-11-04 DIAGNOSIS — Z13.220 SCREENING CHOLESTEROL LEVEL: ICD-10-CM

## 2023-11-04 LAB
ANION GAP SERPL CALCULATED.3IONS-SCNC: 11 MMOL/L (ref 7–15)
BUN SERPL-MCNC: 9.5 MG/DL (ref 6–20)
CALCIUM SERPL-MCNC: 8.9 MG/DL (ref 8.6–10)
CHLORIDE SERPL-SCNC: 103 MMOL/L (ref 98–107)
CHOLEST SERPL-MCNC: 156 MG/DL
CORTIS SERPL-MCNC: 7.9 UG/DL
CREAT SERPL-MCNC: 0.58 MG/DL (ref 0.51–0.95)
CREAT UR-MCNC: 124 MG/DL
DEPRECATED HCO3 PLAS-SCNC: 24 MMOL/L (ref 22–29)
EGFRCR SERPLBLD CKD-EPI 2021: >90 ML/MIN/1.73M2
GLUCOSE SERPL-MCNC: 188 MG/DL (ref 70–99)
HBA1C MFR BLD: 9.5 % (ref 0–5.6)
HDLC SERPL-MCNC: 35 MG/DL
LDLC SERPL CALC-MCNC: 79 MG/DL
MICROALBUMIN UR-MCNC: 52.1 MG/L
MICROALBUMIN/CREAT UR: 42.02 MG/G CR (ref 0–25)
NONHDLC SERPL-MCNC: 121 MG/DL
POTASSIUM SERPL-SCNC: 4.3 MMOL/L (ref 3.4–5.3)
SODIUM SERPL-SCNC: 138 MMOL/L (ref 135–145)
TRIGL SERPL-MCNC: 208 MG/DL
TSH SERPL DL<=0.005 MIU/L-ACNC: 2.48 UIU/ML (ref 0.3–4.2)

## 2023-11-04 PROCEDURE — 82570 ASSAY OF URINE CREATININE: CPT

## 2023-11-04 PROCEDURE — 83036 HEMOGLOBIN GLYCOSYLATED A1C: CPT

## 2023-11-04 PROCEDURE — 80048 BASIC METABOLIC PNL TOTAL CA: CPT

## 2023-11-04 PROCEDURE — 80061 LIPID PANEL: CPT

## 2023-11-04 PROCEDURE — 82043 UR ALBUMIN QUANTITATIVE: CPT

## 2023-11-04 PROCEDURE — 82533 TOTAL CORTISOL: CPT

## 2023-11-04 PROCEDURE — 84443 ASSAY THYROID STIM HORMONE: CPT

## 2023-11-04 PROCEDURE — 36415 COLL VENOUS BLD VENIPUNCTURE: CPT

## 2023-11-06 NOTE — RESULT ENCOUNTER NOTE
Lindsay,   Thank you for coming in for the lab work.   - the A1c continues to show your diabetes is not yet well controlled. Hopefully, we can continue to titrate up the ozempic to help this. If the glucose is not starting to normalize with this strategy, other meds could also be added on. This can all be reviewed at your next upcoming visit.   - the urine protein level is slightly elevated. We generally monitor this protein level. It if persists it could indicate your kidneys are likely stressed from the higher glucose levels and you could be developing early kidney disease. Let's plan to recheck this level with your next A1C.   -  cholesterol panel continues to show high triglycerides and low HDL (good cholesterol). Aerobic exercise, weight loss and moderating your carbohydrate intake (especially simple sugars) will all help to lower the triglyceride level.   -  the kidney and electrolyte panel was normal except your glucose was 188 at the time of the blood draw.  - the cortisol and thyroid hormone tests were normal.   Please continue with the plan we developed in the office.   Please MyChart or call if you have any concerns or questions.   Sincerely,   Micaela Padilla MD

## 2023-11-15 ENCOUNTER — MYC MEDICAL ADVICE (OUTPATIENT)
Dept: FAMILY MEDICINE | Facility: CLINIC | Age: 38
End: 2023-11-15
Payer: COMMERCIAL

## 2023-12-04 ENCOUNTER — OFFICE VISIT (OUTPATIENT)
Dept: FAMILY MEDICINE | Facility: CLINIC | Age: 38
End: 2023-12-04
Payer: COMMERCIAL

## 2023-12-04 VITALS
OXYGEN SATURATION: 97 % | BODY MASS INDEX: 47.09 KG/M2 | RESPIRATION RATE: 17 BRPM | WEIGHT: 293 LBS | TEMPERATURE: 98 F | SYSTOLIC BLOOD PRESSURE: 147 MMHG | HEIGHT: 66 IN | HEART RATE: 78 BPM | DIASTOLIC BLOOD PRESSURE: 98 MMHG

## 2023-12-04 DIAGNOSIS — I10 ESSENTIAL HYPERTENSION: ICD-10-CM

## 2023-12-04 DIAGNOSIS — R80.9 MICROALBUMINURIA: ICD-10-CM

## 2023-12-04 DIAGNOSIS — E11.69 TYPE 2 DIABETES MELLITUS WITH OTHER SPECIFIED COMPLICATION, WITHOUT LONG-TERM CURRENT USE OF INSULIN (H): Primary | ICD-10-CM

## 2023-12-04 LAB
ANION GAP SERPL CALCULATED.3IONS-SCNC: 10 MMOL/L (ref 7–15)
BUN SERPL-MCNC: 11.3 MG/DL (ref 6–20)
CALCIUM SERPL-MCNC: 9 MG/DL (ref 8.6–10)
CHLORIDE SERPL-SCNC: 103 MMOL/L (ref 98–107)
CREAT SERPL-MCNC: 0.56 MG/DL (ref 0.51–0.95)
DEPRECATED HCO3 PLAS-SCNC: 25 MMOL/L (ref 22–29)
EGFRCR SERPLBLD CKD-EPI 2021: >90 ML/MIN/1.73M2
GLUCOSE SERPL-MCNC: 156 MG/DL (ref 70–99)
POTASSIUM SERPL-SCNC: 4.2 MMOL/L (ref 3.4–5.3)
SODIUM SERPL-SCNC: 138 MMOL/L (ref 135–145)

## 2023-12-04 PROCEDURE — 36415 COLL VENOUS BLD VENIPUNCTURE: CPT | Performed by: FAMILY MEDICINE

## 2023-12-04 PROCEDURE — 99207 PR FOOT EXAM NO CHARGE: CPT | Performed by: FAMILY MEDICINE

## 2023-12-04 PROCEDURE — 80048 BASIC METABOLIC PNL TOTAL CA: CPT | Performed by: FAMILY MEDICINE

## 2023-12-04 PROCEDURE — 99214 OFFICE O/P EST MOD 30 MIN: CPT | Performed by: FAMILY MEDICINE

## 2023-12-04 RX ORDER — LISINOPRIL 40 MG/1
40 TABLET ORAL DAILY
Qty: 30 TABLET | Refills: 3 | Status: SHIPPED | OUTPATIENT
Start: 2023-12-04 | End: 2024-02-20

## 2023-12-04 NOTE — PATIENT INSTRUCTIONS
Increase ozempic to 1 mg weekly after you finish full 4 weeks at the 0.5 mg dose.   Message me after 3-4 weeks of the 1 mg dose of ozempic if wanting to continue to increase dose.     Continue metformin    Increase lisinopril to 40 mg daily  Start monitoring home blood pressures.     Monitor blood pressure. Goal blood pressure  is < 140/90 but < 130/80 is ideal  - check blood pressure at rest for 5-10 minute  - avoid checking blood pressure within 30 minutes of caffeine or exercise.      Tips to lower blood pressure  - lower the sodium intake in your diet. Aim for 2,000 mg per day or less  - exercise daily, especially aerobic exercise can lower blood pressure  - minimize caffeine and alcohol  - work on lowering your weight. Even small drops in weight can reduce blood pressure

## 2023-12-04 NOTE — RESULT ENCOUNTER NOTE
Ty Montoya,  It was nice to see you again in the office today.  Your potassium and kidney function look great.  Your glucose was 156 at the time of testing.  Please continue with the plan we discussed at the office.  Kind regards,  Micaela Padilla MD

## 2023-12-04 NOTE — PROGRESS NOTES
Assessment & Plan     Type 2 diabetes mellitus with other specified complication, without long-term current use of insulin (H)  Improving control since back on her metformin consistently and now on Ozempic.  Is happy with the small benefit she sees already on Ozempic and would like to continue to titrate up dose.  After she finishes 4 weeks of the 0.5 mg dose will titrate to the 1 mg weekly dose.  Can message after another 4 weeks if she would like to continue to titrate to next step up.  - Semaglutide, 1 MG/DOSE, (OZEMPIC) 4 MG/3ML pen; Inject 1 mg Subcutaneous every 7 days  - lisinopril (ZESTRIL) 40 MG tablet; Take 1 tablet (40 mg) by mouth daily  - Basic metabolic panel  (Ca, Cl, CO2, Creat, Gluc, K, Na, BUN); Future  - Home Blood Pressure Monitor Order for DME - ONLY FOR DME  - FOOT EXAM  - Basic metabolic panel  (Ca, Cl, CO2, Creat, Gluc, K, Na, BUN)    Microalbuminuria  Noted last visit, likely due to hyperglycemia.  Restarted ACE inhibitor and will titrate up dose.  Recheck microalbumin with next diabetes visit    Essential hypertension  Not controlled.  Started lisinopril 10 mg last visit.  She has tolerated this medication without side effects but unfortunately her blood pressure has not really improved.  We will push dose up to 40 mg given her pretty high diastolic today.  Encouraged her to start home blood pressure monitoring and prescription DME order for home BP cuff given.  - lisinopril (ZESTRIL) 40 MG tablet; Take 1 tablet (40 mg) by mouth daily  - Basic metabolic panel  (Ca, Cl, CO2, Creat, Gluc, K, Na, BUN); Future  - Basic metabolic panel  (Ca, Cl, CO2, Creat, Gluc, K, Na, BUN)    Plan for next visit in 3 months and as needed     Patient Instructions   Increase ozempic to 1 mg weekly after you finish full 4 weeks at the 0.5 mg dose.   Message me after 3-4 weeks of the 1 mg dose of ozempic if wanting to continue to increase dose.     Continue metformin    Increase lisinopril to 40 mg daily  Start  monitoring home blood pressures.     Monitor blood pressure. Goal blood pressure  is < 140/90 but < 130/80 is ideal  - check blood pressure at rest for 5-10 minute  - avoid checking blood pressure within 30 minutes of caffeine or exercise.      Tips to lower blood pressure  - lower the sodium intake in your diet. Aim for 2,000 mg per day or less  - exercise daily, especially aerobic exercise can lower blood pressure  - minimize caffeine and alcohol  - work on lowering your weight. Even small drops in weight can reduce blood pressure       Micaela Padilla MD  [facility] CONNIE Montoya is a 38 year old, presenting for the following health issues:  Diabetes        12/4/2023     9:58 AM   Additional Questions   Roomed by Jazmine   Accompanied by Selff         12/4/2023     9:58 AM   Patient Reported Additional Medications   Patient reports taking the following new medications None       History of Present Illness       Diabetes:   She presents for follow up of diabetes.   She is checking home blood glucose with a continuous glucose monitor.   She checks blood glucose before and after meals and at bedtime.  Blood glucose is sometimes over 200 and never under 70. She is aware of hypoglycemia symptoms including none.   She is concerned about blood sugar frequently over 200.   She is having burning in feet.  The patient has not had a diabetic eye exam in the last 12 months.          She eats 2-3 servings of fruits and vegetables daily.She consumes 0 sweetened beverage(s) daily.She exercises with enough effort to increase her heart rate 9 or less minutes per day.  She exercises with enough effort to increase her heart rate 3 or less days per week. She is missing 1 dose(s) of medications per week.  She is not taking prescribed medications regularly due to remembering to take.               Oct visit start back on metformin. Back up to full dose and seem to tolerating  Also started  "ozempic. Not noting large benefit yet in hunger/eating habits.  Started at the 0.25 mg dose and currently on 0.5 mg dose for two weeks. Possibly mild decrease portion sizes on the 0.5 mg dose.   Mild nausea at higher dose for one day.     Had been playing pickle ball with friends but now with colder weather hasn't been doing that.     Hasn't look into retickr program yet. Waiting for her busy work season to end.     Glucose falling in the \"healthy range\" most of the time.   170-180 (CGM) average glucose  200's in morning and then eat cottage cheese and will drop to 160's and eventually down to 130's.   Lunch 150-160 range.   Dinner tends to be higher carb meal/tend to graze/snack. 200's at hs. Trying to not eat past 8:30. By hs < 180    Also started lisinopril last visit. Forgets to take 1 x a week.     Had eye exam this year.           Review of Systems         Objective    BP (!) 147/98 (BP Location: Right arm, Patient Position: Sitting, Cuff Size: Adult Large)   Pulse 78   Temp 98  F (36.7  C) (Oral)   Resp 17   Ht 1.676 m (5' 6\")   Wt 133.1 kg (293 lb 8 oz)   LMP 11/13/2023 (Approximate)   SpO2 97%   BMI 47.37 kg/m    Body mass index is 47.37 kg/m .  Physical Exam   GENERAL: healthy, alert and no distress  NECK: no adenopathy, no asymmetry, masses, or scars and thyroid normal to palpation  RESP: lungs clear to auscultation - no rales, rhonchi or wheezes  CV: regular rate and rhythm, normal S1 S2, no S3 or S4, no murmur, click or rub, no peripheral edema and peripheral pulses strong  ABDOMEN: soft, nontender, no hepatosplenomegaly, no masses and bowel sounds normal  MS: no gross musculoskeletal defects noted, no edema  PSYCH: mentation appears normal, affect normal/bright    Lab on 11/04/2023   Component Date Value Ref Range Status    Hemoglobin A1C 11/04/2023 9.5 (H)  0.0 - 5.6 % Final    Normal <5.7%   Prediabetes 5.7-6.4%    Diabetes 6.5% or higher     Note: Adopted from ADA consensus guidelines.    " Cholesterol 11/04/2023 156  <200 mg/dL Final    Triglycerides 11/04/2023 208 (H)  <150 mg/dL Final    Direct Measure HDL 11/04/2023 35 (L)  >=50 mg/dL Final    LDL Cholesterol Calculated 11/04/2023 79  <=100 mg/dL Final    Non HDL Cholesterol 11/04/2023 121  <130 mg/dL Final    Creatinine Urine mg/dL 11/04/2023 124.0  mg/dL Final    The reference ranges have not been established in urine creatinine. The results should be integrated into the clinical context for interpretation.    Albumin Urine mg/L 11/04/2023 52.1  mg/L Final    The reference ranges have not been established in urine albumin. The results should be integrated into the clinical context for interpretation.    Albumin Urine mg/g Cr 11/04/2023 42.02 (H)  0.00 - 25.00 mg/g Cr Final    Microalbuminuria is defined as an albumin:creatinine ratio of 17 to 299 for males and 25 to 299 for females. A ratio of albumin:creatinine of 300 or higher is indicative of overt proteinuria.  Due to biologic variability, positive results should be confirmed by a second, first-morning random or 24-hour timed urine specimen. If there is discrepancy, a third specimen is recommended. When 2 out of 3 results are in the microalbuminuria range, this is evidence for incipient nephropathy and warrants increased efforts at glucose control, blood pressure control, and institution of therapy with an angiotensin-converting-enzyme (ACE) inhibitor (if the patient can tolerate it).      Sodium 11/04/2023 138  135 - 145 mmol/L Final    Reference intervals for this test were updated on 09/26/2023 to more accurately reflect our healthy population. There may be differences in the flagging of prior results with similar values performed with this method. Interpretation of those prior results can be made in the context of the updated reference intervals.     Potassium 11/04/2023 4.3  3.4 - 5.3 mmol/L Final    Chloride 11/04/2023 103  98 - 107 mmol/L Final    Carbon Dioxide (CO2) 11/04/2023 24  22  - 29 mmol/L Final    Anion Gap 11/04/2023 11  7 - 15 mmol/L Final    Urea Nitrogen 11/04/2023 9.5  6.0 - 20.0 mg/dL Final    Creatinine 11/04/2023 0.58  0.51 - 0.95 mg/dL Final    GFR Estimate 11/04/2023 >90  >60 mL/min/1.73m2 Final    Calcium 11/04/2023 8.9  8.6 - 10.0 mg/dL Final    Glucose 11/04/2023 188 (H)  70 - 99 mg/dL Final    Cortisol 11/04/2023 7.9    ug/dL Final    6 months and older:  8 AM Cortisol Reference Range:  4-22 ug/dL   4 PM Cortisol Reference Range:  3-17 ug/dL    TSH 11/04/2023 2.48  0.30 - 4.20 uIU/mL Final

## 2024-02-20 DIAGNOSIS — E11.69 TYPE 2 DIABETES MELLITUS WITH OTHER SPECIFIED COMPLICATION, WITHOUT LONG-TERM CURRENT USE OF INSULIN (H): ICD-10-CM

## 2024-02-20 DIAGNOSIS — I10 ESSENTIAL HYPERTENSION: ICD-10-CM

## 2024-02-20 DIAGNOSIS — R80.9 MICROALBUMINURIA: ICD-10-CM

## 2024-02-20 NOTE — TELEPHONE ENCOUNTER
Please clarify what dose of lisinopril patient is taking, has two doses indicated on med list. Route message back to refill pool.

## 2024-02-20 NOTE — TELEPHONE ENCOUNTER
PT STATES NEEDS REFILLS ON LISINOPRIL AND OZEMPIC PLEASE ADVISE,   SEND  TO [facility] ONLY INSURANCE WILL NOT COVER AND [place] AND [facility] [phone] MAY LEAVE MESSAGE

## 2024-02-21 RX ORDER — LISINOPRIL 40 MG/1
40 TABLET ORAL DAILY
Qty: 90 TABLET | Refills: 3 | Status: SHIPPED | OUTPATIENT
Start: 2024-02-21

## 2024-02-25 ENCOUNTER — HEALTH MAINTENANCE LETTER (OUTPATIENT)
Age: 39
End: 2024-02-25

## 2024-03-19 DIAGNOSIS — E11.69 TYPE 2 DIABETES MELLITUS WITH OTHER SPECIFIED COMPLICATION, WITHOUT LONG-TERM CURRENT USE OF INSULIN (H): ICD-10-CM

## 2024-03-19 RX ORDER — SEMAGLUTIDE 1.34 MG/ML
INJECTION, SOLUTION SUBCUTANEOUS
Qty: 9 ML | Refills: 0 | Status: SHIPPED | OUTPATIENT
Start: 2024-03-19 | End: 2024-04-23

## 2024-03-25 DIAGNOSIS — E11.65 TYPE 2 DIABETES MELLITUS WITH HYPERGLYCEMIA, WITHOUT LONG-TERM CURRENT USE OF INSULIN (H): ICD-10-CM

## 2024-03-25 RX ORDER — METFORMIN HCL 500 MG
1000 TABLET, EXTENDED RELEASE 24 HR ORAL 2 TIMES DAILY WITH MEALS
Qty: 360 TABLET | Refills: 3 | OUTPATIENT
Start: 2024-03-25

## 2024-04-10 DIAGNOSIS — E11.65 TYPE 2 DIABETES MELLITUS WITH HYPERGLYCEMIA, WITHOUT LONG-TERM CURRENT USE OF INSULIN (H): ICD-10-CM

## 2024-04-10 RX ORDER — METFORMIN HCL 500 MG
1000 TABLET, EXTENDED RELEASE 24 HR ORAL 2 TIMES DAILY WITH MEALS
Qty: 360 TABLET | Refills: 1 | Status: SHIPPED | OUTPATIENT
Start: 2024-04-10

## 2024-04-10 RX ORDER — METFORMIN HCL 500 MG
1000 TABLET, EXTENDED RELEASE 24 HR ORAL 2 TIMES DAILY WITH MEALS
Qty: 360 TABLET | Refills: 3 | Status: CANCELLED | OUTPATIENT
Start: 2024-04-10

## 2024-04-23 ENCOUNTER — MYC REFILL (OUTPATIENT)
Dept: FAMILY MEDICINE | Facility: CLINIC | Age: 39
End: 2024-04-23
Payer: COMMERCIAL

## 2024-04-23 DIAGNOSIS — E11.69 TYPE 2 DIABETES MELLITUS WITH OTHER SPECIFIED COMPLICATION, WITHOUT LONG-TERM CURRENT USE OF INSULIN (H): ICD-10-CM

## 2024-04-23 RX ORDER — SEMAGLUTIDE 1.34 MG/ML
1 INJECTION, SOLUTION SUBCUTANEOUS
Qty: 9 ML | Refills: 0 | Status: SHIPPED | OUTPATIENT
Start: 2024-04-23 | End: 2024-06-13

## 2024-06-12 ENCOUNTER — TELEPHONE (OUTPATIENT)
Dept: FAMILY MEDICINE | Facility: CLINIC | Age: 39
End: 2024-06-12
Payer: COMMERCIAL

## 2024-06-12 DIAGNOSIS — E11.69 TYPE 2 DIABETES MELLITUS WITH OTHER SPECIFIED COMPLICATION, WITHOUT LONG-TERM CURRENT USE OF INSULIN (H): ICD-10-CM

## 2024-06-13 RX ORDER — SEMAGLUTIDE 1.34 MG/ML
INJECTION, SOLUTION SUBCUTANEOUS
Qty: 9 ML | Refills: 0 | Status: SHIPPED | OUTPATIENT
Start: 2024-06-13 | End: 2024-08-15

## 2024-06-13 NOTE — TELEPHONE ENCOUNTER
Due for diabetes visit  And labs-  no appointment currently scheduled  Mitra fill given   Please get her scheduled -virtual visit  fine.

## 2024-06-14 ENCOUNTER — TRANSFERRED RECORDS (OUTPATIENT)
Dept: MULTI SPECIALTY CLINIC | Facility: CLINIC | Age: 39
End: 2024-06-14

## 2024-06-14 LAB — RETINOPATHY: NORMAL

## 2024-06-29 ENCOUNTER — PATIENT OUTREACH (OUTPATIENT)
Dept: CARE COORDINATION | Facility: CLINIC | Age: 39
End: 2024-06-29
Payer: COMMERCIAL

## 2024-07-14 ENCOUNTER — HEALTH MAINTENANCE LETTER (OUTPATIENT)
Age: 39
End: 2024-07-14

## 2024-07-18 ENCOUNTER — LAB (OUTPATIENT)
Dept: LAB | Facility: CLINIC | Age: 39
End: 2024-07-18
Payer: COMMERCIAL

## 2024-07-18 DIAGNOSIS — E11.69 TYPE 2 DIABETES MELLITUS WITH OTHER SPECIFIED COMPLICATION, WITHOUT LONG-TERM CURRENT USE OF INSULIN (H): ICD-10-CM

## 2024-07-18 LAB — HBA1C MFR BLD: 8 % (ref 0–5.6)

## 2024-07-18 PROCEDURE — 36415 COLL VENOUS BLD VENIPUNCTURE: CPT

## 2024-07-18 PROCEDURE — 83036 HEMOGLOBIN GLYCOSYLATED A1C: CPT

## 2024-07-18 NOTE — LETTER
July 26, 2024      Lindsay Singleton  [address]  [place] [address]        Dear MsNicho,    We are writing to inform you of your test results.    Your A1C was improved from 8 months ago but still above the goal of < 7. You are due for a diabetic clinic visit to review your medications and your diabetes control. Please get this scheduled asap so we can make a good plan going forward to continue to get your diabetes under better control. A virtual visit would be okay if needed.     Resulted Orders   HEMOGLOBIN A1C   Result Value Ref Range    Hemoglobin A1C 8.0 (H) 0.0 - 5.6 %      Comment:      Normal <5.7%   Prediabetes 5.7-6.4%    Diabetes 6.5% or higher     Note: Adopted from ADA consensus guidelines.       If you have any questions or concerns, please call the clinic at the number listed above.       Sincerely,      Micaela Padilla MD

## 2024-07-19 NOTE — RESULT ENCOUNTER NOTE
Lindsay,  Your A1C was improved from 8 months ago but still above the goal of < 7. You are due for a diabetic clinic visit to review your medications and your diabetes control. Please get this scheduled asap so we can make a good plan going forward to continue to get your diabetes under better control. A virtual visit would be okay if needed.  Kind regards,  Micaela Padilla MD

## 2024-08-14 DIAGNOSIS — E11.69 TYPE 2 DIABETES MELLITUS WITH OTHER SPECIFIED COMPLICATION, WITHOUT LONG-TERM CURRENT USE OF INSULIN (H): ICD-10-CM

## 2024-08-15 RX ORDER — SEMAGLUTIDE 1.34 MG/ML
1 INJECTION, SOLUTION SUBCUTANEOUS
Qty: 9 ML | Refills: 0 | Status: SHIPPED | OUTPATIENT
Start: 2024-08-15

## 2024-10-17 DIAGNOSIS — E11.69 TYPE 2 DIABETES MELLITUS WITH OTHER SPECIFIED COMPLICATION, WITHOUT LONG-TERM CURRENT USE OF INSULIN (H): ICD-10-CM

## 2024-10-17 RX ORDER — SEMAGLUTIDE 1.34 MG/ML
INJECTION, SOLUTION SUBCUTANEOUS
Qty: 9 ML | Refills: 3 | OUTPATIENT
Start: 2024-10-17

## 2024-10-21 ENCOUNTER — TELEPHONE (OUTPATIENT)
Dept: FAMILY MEDICINE | Facility: CLINIC | Age: 39
End: 2024-10-21

## 2024-11-12 ENCOUNTER — VIRTUAL VISIT (OUTPATIENT)
Dept: FAMILY MEDICINE | Facility: CLINIC | Age: 39
End: 2024-11-12
Payer: COMMERCIAL

## 2024-11-12 DIAGNOSIS — E66.01 MORBID OBESITY (H): ICD-10-CM

## 2024-11-12 DIAGNOSIS — E11.69 TYPE 2 DIABETES MELLITUS WITH OTHER SPECIFIED COMPLICATION, WITHOUT LONG-TERM CURRENT USE OF INSULIN (H): ICD-10-CM

## 2024-11-12 DIAGNOSIS — N92.4 EXCESSIVE BLEEDING IN PREMENOPAUSAL PERIOD: ICD-10-CM

## 2024-11-12 DIAGNOSIS — Z30.9 ENCOUNTER FOR CONTRACEPTIVE MANAGEMENT, UNSPECIFIED TYPE: ICD-10-CM

## 2024-11-12 DIAGNOSIS — I10 ESSENTIAL HYPERTENSION: ICD-10-CM

## 2024-11-12 DIAGNOSIS — E11.69 TYPE 2 DIABETES MELLITUS WITH OTHER SPECIFIED COMPLICATION, WITHOUT LONG-TERM CURRENT USE OF INSULIN (H): Primary | ICD-10-CM

## 2024-11-12 DIAGNOSIS — E11.65 TYPE 2 DIABETES MELLITUS WITH HYPERGLYCEMIA, WITHOUT LONG-TERM CURRENT USE OF INSULIN (H): ICD-10-CM

## 2024-11-12 PROCEDURE — G2211 COMPLEX E/M VISIT ADD ON: HCPCS | Mod: 95 | Performed by: FAMILY MEDICINE

## 2024-11-12 PROCEDURE — 99214 OFFICE O/P EST MOD 30 MIN: CPT | Mod: 95 | Performed by: FAMILY MEDICINE

## 2024-11-12 RX ORDER — LISINOPRIL 40 MG/1
40 TABLET ORAL DAILY
Qty: 90 TABLET | Refills: 3 | OUTPATIENT
Start: 2024-11-12

## 2024-11-12 RX ORDER — METFORMIN HYDROCHLORIDE 500 MG/1
1000 TABLET, EXTENDED RELEASE ORAL 2 TIMES DAILY WITH MEALS
Qty: 360 TABLET | Refills: 1 | Status: SHIPPED | OUTPATIENT
Start: 2024-11-12

## 2024-11-12 NOTE — PROGRESS NOTES
"  If patient has telephone visit, have they been educated on video visit as preferred visit method and offered to change to video visit? NOT APPLICABLE        Instructions Relayed to Patient by Virtual Roomer:     Patient is active on Plated:   Relayed following to patient: \"It looks like you are active on Plated, are you able to join the visit this way? If not, do you need us to send you a link now or would you like your provider to send a link via text or email when they are ready to initiate the visit?\"      Patient Confirmed they will join visit via: [employer]  Reminded patient to ensure they were logged on to virtual visit by arrival time listed.   Asked if patient has flexibility to initiate visit sooner than arrival time: patient stated yes, documented in appointment notes availability to initiate visit earlier than arrival time     If pediatric virtual visit, ensured pediatric patient along with parent/guardian will be present for video visit.     Patient offered the website [email or web address] and/or phone number to Plated Help line: [phone]        Answers submitted by the patient for this visit:  Diabetes Visit (Submitted on 11/11/2024)  Chief Complaint: Chronic problems general questions HPI Form  Frequency of checking blood sugars:: not at all  Diabetic concerns:: other  Paraesthesia present:: none of these symptoms  Have you had a diabetic eye exam within the last year?: Yes  Date of last eye exam: : June 2024  Where was this eye exam done?: [facility]  General Questionnaire (Submitted on 11/11/2024)  Chief Complaint: Chronic problems general questions HPI Form  How many servings of fruits and vegetables do you eat daily?: 2-3  On average, how many sweetened beverages do you drink each day (Examples: soda, juice, sweet tea, etc.  Do NOT count diet or artificially sweetened beverages)?: 0  How many minutes a day do you exercise enough to make your heart beat " faster?: 9 or less  How many days a week do you exercise enough to make your heart beat faster?: 3 or less  How many days per week do you miss taking your medication?: 0  Questionnaire about: Chronic problems general questions HPI Form (Submitted on 11/11/2024)  Chief Complaint: Chronic problems general questions HPI Form  Lindsay is a 39 year old who is being evaluated via a billable video visit.    How would you like to obtain your AVS? MyChart  If the video visit is dropped, the invitation should be resent by: Text to cell phone: [phone]  Will anyone else be joining your video visit? No      Assessment & Plan     Type 2 diabetes mellitus with hyperglycemia, without long-term current use of insulin (H)  Type 2 diabetes mellitus with other specified complication, without long-term current use of insulin (H)  Has done well with the Ozempic and has lost a small amount of weight and noted improvement in volume and type of intake.  She is motivated to continue to increase the dose to continue to help with weight management and better glucose control.  Will increase Ozempic from 1 to 2 mg weekly.  Discussed typical side effects and she will contact me if any problems.  Will tentatively plan for follow-up in 6 months but consider a 3-month follow-up if A1c still significantly elevated.  She is scheduled for all her diabetic labs this weekend  She continues on an ACE inhibitor but is not yet on a statin or aspirin.  - metFORMIN (GLUCOPHAGE XR) 500 MG 24 hr tablet; Take 2 tablets (1,000 mg) by mouth 2 times daily (with meals).  - Semaglutide, 2 MG/DOSE, (OZEMPIC) 8 MG/3ML pen; Inject 2 mg subcutaneously every 7 days.    Morbid obesity (H)  Good interval weight loss on the Ozempic.  I encouraged her to start more regular activity which she is already motivated to do.  We also discussed adding in strength training as she is losing weight.  She will continue to work on healthy portions and healthier food choices as she  "has been.     Encounter for contraceptive management, unspecified type  Excessive bleeding in premenopausal period  I am a little concerned that she is on lisinopril with only condoms for contraception.  She really could benefit with a hormonal IUD to have better menstrual control and contraception.  Recommended she get this set up with gynecology.  As we are talking through her bleeding patterns it does seem somewhat excessive so we will make sure to check hemoglobin with upcoming labs and encouraged her to talk with gynecology about the bleeding as an endometrial biopsy may be warranted also  - Ob/Gyn  Referral; Future  - CBC with Platelets & Differential; Future    HTN  Unknown control right now.  She is on lisinopril 40 mg and is now taking it more consistently than she has in the past.  Also with some weight loss and more movement I am hoping her blood pressure will be better.  She will schedule MA BP recheck      BMI  Estimated body mass index is 47.37 kg/m  as calculated from the following:    Height as of 12/4/23: 1.676 m (5' 6\").    Weight as of 12/4/23: 133.1 kg (293 lb 8 oz).   Weight management plan: Discussed healthy diet and exercise guidelines      See Patient Instructions  Patient Instructions   Increase movement and strength training.     Schedule medical assistant blood pressure check    Schedule with gynecology to review your heavy menses and possibly have IUD placed for contraception and cycle control. They may also recommend an endometrial biopsy to make sure the cells in the uterus are healthy and working properly      Subjective   Lindsay is a 39 year old, presenting for the following health issues:  Diabetes        11/12/2024     9:54 AM   Additional Questions   Roomed by Tshia   Accompanied by self       Via the Health Maintenance questionnaire, the patient has reported the following services have been completed -Eye Exam: [facility] 2024-06-14, this information has " "been sent to the abstraction team.  History of Present Illness       Diabetes:   She presents for follow up of diabetes.    She is not checking blood glucose.        She is concerned about other.    She is not experiencing numbness or burning in feet, excessive thirst, blurry vision, weight changes or redness, sores or blisters on feet. The patient has had a diabetic eye exam in the last 12 months. Eye exam performed on June 2024. Location of last eye exam [facility].        She eats 2-3 servings of fruits and vegetables daily.She consumes 0 sweetened beverage(s) daily.She exercises with enough effort to increase her heart rate 9 or less minutes per day.  She exercises with enough effort to increase her heart rate 3 or less days per week.   She is taking medications regularly.     Last visit for DM care was 12/2023    Has been on ozempic for the last year.   Seeing good changes- eating smaller portions, feeling coppola sooner. These benefits are noticeable but \"mild\" but interested in higher dosing.   Has lost about 10 lbs on the 1 mg dose of ozempic in the last 4 mo.   No n/gerd/bowel concerns/abd pain.   Not checking glucose levels recently but they were trending down when still checking. Usually < 200. Mildly elevated 3-4 days a week.   Glucose monitor is busted but new one is coming in the mail    Not currently getting regular activity but motivated to restart. In [place] in July and tried to build up walking prior to that trip but then came home and slowly got out of habit.     Had labs scheduled but missed the appt. Rescheduled for next Sat.     Maybe once a week at night will notice burning in feet at night, especially if had too much gluc. Didn't notice as much when walking more.     Feels ozempic has helped her eat more fruits/veggies. Eating more salads, berries. Trying to change snacking to healthier options. Plain yogart at night with fruit instead of ice-cream.    Has been more consistent " with taking lisinopril. Has reminders on her phone.   Heavy flow. Cycle lasts , 1st day is heaviest and will use 7-10 pads per day, 2nd day 4-5 pads.  Condoms. Tends to take the day off on day #1              Objective           Vitals:  No vitals were obtained today due to virtual visit.    Physical Exam   GENERAL: alert and no distress  EYES: Eyes grossly normal to inspection.  No discharge or erythema, or obvious scleral/conjunctival abnormalities.  RESP: No audible wheeze, cough, or visible cyanosis.    SKIN: Visible skin clear. No significant rash, abnormal pigmentation or lesions.  NEURO: Cranial nerves grossly intact.  Mentation and speech appropriate for age.  PSYCH: Appropriate affect, tone, and pace of words            Component      Latest Ref Rng 11/4/2023  9:55 AM 11/4/2023  10:01 AM 12/4/2023  10:54 AM 7/18/2024  10:00 AM   Sodium      135 - 145 mmol/L 138   138     Potassium      3.4 - 5.3 mmol/L 4.3   4.2     Chloride      98 - 107 mmol/L 103   103     Carbon Dioxide (CO2)      22 - 29 mmol/L 24   25     Anion Gap      7 - 15 mmol/L 11   10     Urea Nitrogen      6.0 - 20.0 mg/dL 9.5   11.3     Creatinine      0.51 - 0.95 mg/dL 0.58   0.56     GFR Estimate      >60 mL/min/1.73m2 >90   >90     Calcium      8.6 - 10.0 mg/dL 8.9   9.0     Glucose      70 - 99 mg/dL 188 (H)   156 (H)     Cholesterol      <200 mg/dL 156       Triglycerides      <150 mg/dL 208 (H)       HDL Cholesterol      >=50 mg/dL 35 (L)       LDL Cholesterol Calculated      <=100 mg/dL 79       Non HDL Cholesterol      <130 mg/dL 121       Creatinine Urine      mg/dL  124.0      Albumin Urine mg/L      mg/L  52.1      Albumin Urine mg/g Cr      0.00 - 25.00 mg/g Cr  42.02 (H)      Hemoglobin A1C      0.0 - 5.6 % 9.5 (H)    8.0 (H)    Cortisol Serum        ug/dL 7.9       TSH      0.30 - 4.20 uIU/mL 2.48          Legend:  (H) High  (L) Low  Video-Visit Details    Type of service:  Video Visit   Originating Location (pt. Location):  Home    Distant Location (provider location):  Off-site  Platform used for Video Visit: Humberto  Signed Electronically by: Micaela Pdailla MD

## 2024-11-12 NOTE — PATIENT INSTRUCTIONS
Increase movement and strength training.     Schedule medical assistant blood pressure check    Schedule with gynecology to review your heavy menses and possibly have IUD placed for contraception and cycle control. They may also recommend an endometrial biopsy to make sure the cells in the uterus are healthy and working properly

## 2024-11-20 ENCOUNTER — LAB (OUTPATIENT)
Dept: LAB | Facility: CLINIC | Age: 39
End: 2024-11-20
Payer: COMMERCIAL

## 2024-11-20 DIAGNOSIS — N92.4 EXCESSIVE BLEEDING IN PREMENOPAUSAL PERIOD: ICD-10-CM

## 2024-11-20 DIAGNOSIS — E11.69 TYPE 2 DIABETES MELLITUS WITH OTHER SPECIFIED COMPLICATION, WITHOUT LONG-TERM CURRENT USE OF INSULIN (H): ICD-10-CM

## 2024-11-20 LAB
BASOPHILS # BLD AUTO: 0.1 10E3/UL (ref 0–0.2)
BASOPHILS NFR BLD AUTO: 1 %
EOSINOPHIL # BLD AUTO: 0.2 10E3/UL (ref 0–0.7)
EOSINOPHIL NFR BLD AUTO: 2 %
ERYTHROCYTE [DISTWIDTH] IN BLOOD BY AUTOMATED COUNT: 11.9 % (ref 10–15)
EST. AVERAGE GLUCOSE BLD GHB EST-MCNC: 148 MG/DL
HBA1C MFR BLD: 6.8 % (ref 0–5.6)
HCT VFR BLD AUTO: 41.5 % (ref 35–47)
HGB BLD-MCNC: 14.3 G/DL (ref 11.7–15.7)
IMM GRANULOCYTES # BLD: 0 10E3/UL
IMM GRANULOCYTES NFR BLD: 0 %
LYMPHOCYTES # BLD AUTO: 3.6 10E3/UL (ref 0.8–5.3)
LYMPHOCYTES NFR BLD AUTO: 36 %
MCH RBC QN AUTO: 29.6 PG (ref 26.5–33)
MCHC RBC AUTO-ENTMCNC: 34.5 G/DL (ref 31.5–36.5)
MCV RBC AUTO: 86 FL (ref 78–100)
MONOCYTES # BLD AUTO: 0.8 10E3/UL (ref 0–1.3)
MONOCYTES NFR BLD AUTO: 9 %
NEUTROPHILS # BLD AUTO: 5.3 10E3/UL (ref 1.6–8.3)
NEUTROPHILS NFR BLD AUTO: 53 %
PLATELET # BLD AUTO: 350 10E3/UL (ref 150–450)
RBC # BLD AUTO: 4.83 10E6/UL (ref 3.8–5.2)
WBC # BLD AUTO: 9.9 10E3/UL (ref 4–11)

## 2024-11-20 PROCEDURE — 85025 COMPLETE CBC W/AUTO DIFF WBC: CPT

## 2024-11-20 PROCEDURE — 36415 COLL VENOUS BLD VENIPUNCTURE: CPT

## 2024-11-20 PROCEDURE — 83036 HEMOGLOBIN GLYCOSYLATED A1C: CPT

## 2024-11-21 LAB
ALBUMIN SERPL BCG-MCNC: 4.1 G/DL (ref 3.5–5.2)
ALP SERPL-CCNC: 65 U/L (ref 40–150)
ALT SERPL W P-5'-P-CCNC: 41 U/L (ref 0–50)
ANION GAP SERPL CALCULATED.3IONS-SCNC: 11 MMOL/L (ref 7–15)
AST SERPL W P-5'-P-CCNC: 30 U/L (ref 0–45)
BILIRUB SERPL-MCNC: 1.2 MG/DL
BUN SERPL-MCNC: 15 MG/DL (ref 6–20)
CALCIUM SERPL-MCNC: 9.5 MG/DL (ref 8.8–10.4)
CHLORIDE SERPL-SCNC: 102 MMOL/L (ref 98–107)
CHOLEST SERPL-MCNC: 149 MG/DL
CREAT SERPL-MCNC: 0.76 MG/DL (ref 0.51–0.95)
CREAT UR-MCNC: 216 MG/DL
EGFRCR SERPLBLD CKD-EPI 2021: >90 ML/MIN/1.73M2
FASTING STATUS PATIENT QL REPORTED: NO
FASTING STATUS PATIENT QL REPORTED: NO
GLUCOSE SERPL-MCNC: 127 MG/DL (ref 70–99)
HCO3 SERPL-SCNC: 25 MMOL/L (ref 22–29)
HDLC SERPL-MCNC: 33 MG/DL
LDLC SERPL CALC-MCNC: 75 MG/DL
MICROALBUMIN UR-MCNC: 25.1 MG/L
MICROALBUMIN/CREAT UR: 11.62 MG/G CR (ref 0–25)
NONHDLC SERPL-MCNC: 116 MG/DL
POTASSIUM SERPL-SCNC: 4.8 MMOL/L (ref 3.4–5.3)
PROT SERPL-MCNC: 7.3 G/DL (ref 6.4–8.3)
SODIUM SERPL-SCNC: 138 MMOL/L (ref 135–145)
TRIGL SERPL-MCNC: 203 MG/DL

## 2024-11-21 NOTE — RESULT ENCOUNTER NOTE
Ty Montoya,  Thanks for coming in for the labs.  The kidney, liver and electrolyte panel Looks great. Your glucose was 127 at the time of the blood draw.  Cholesterol panel is pretty similar to past checks.  Remains under good controlThe LDL (bad cholesterol) but the triglycerides remain slightly elevated and the HDL remains low. Aerobic exercise, weight loss and moderating your carbohydrate intake (especially simple sugars) will all help to lower the triglyceride level. Aerobic exercise is the best way to increase the HDL (good cholesterol).    Your A1c test is remarkably better. You are now considered under good control for your diabetes!  Keep up the good work  Urine protein level is back to normal range, likely in part due to your improved glucose levels.  Your blood count panel is normal.  Kind regards,  Micaela Padilla MD

## 2024-12-18 DIAGNOSIS — E11.69 TYPE 2 DIABETES MELLITUS WITH OTHER SPECIFIED COMPLICATION, WITHOUT LONG-TERM CURRENT USE OF INSULIN (H): ICD-10-CM

## 2024-12-18 DIAGNOSIS — I10 ESSENTIAL HYPERTENSION: ICD-10-CM

## 2024-12-19 RX ORDER — LISINOPRIL 40 MG/1
40 TABLET ORAL DAILY
Qty: 90 TABLET | Refills: 3 | OUTPATIENT
Start: 2024-12-19

## 2025-01-29 ENCOUNTER — DOCUMENTATION ONLY (OUTPATIENT)
Dept: FAMILY MEDICINE | Facility: CLINIC | Age: 40
End: 2025-01-29

## 2025-01-29 NOTE — PROGRESS NOTES
Lindsay Singleton has an upcoming lab appointment: patient is requesting comprehensive biometrics.    Future Appointments   Date Time Provider Department Center   [facility]2025 11:[facility]   5/12/2025  9:40 AM Micaela Padilla MD [facility]         There is no order available. Please review and place either future orders or HMPO (Review of Health Maintenance Protocol Orders), as appropriate.    Health Maintenance Due   Topic    ANNUAL REVIEW OF HM ORDERS      Alyson Monet

## 2025-03-15 ENCOUNTER — HEALTH MAINTENANCE LETTER (OUTPATIENT)
Age: 40
End: 2025-03-15

## 2025-05-06 DIAGNOSIS — I10 ESSENTIAL HYPERTENSION: ICD-10-CM

## 2025-05-06 DIAGNOSIS — E11.69 TYPE 2 DIABETES MELLITUS WITH OTHER SPECIFIED COMPLICATION, WITHOUT LONG-TERM CURRENT USE OF INSULIN (H): ICD-10-CM

## 2025-05-06 RX ORDER — LISINOPRIL 40 MG/1
40 TABLET ORAL DAILY
Qty: 90 TABLET | Refills: 3 | Status: SHIPPED | OUTPATIENT
Start: 2025-05-06

## 2025-05-06 RX ORDER — SEMAGLUTIDE 2.68 MG/ML
2 INJECTION, SOLUTION SUBCUTANEOUS
Qty: 9 ML | Refills: 0 | Status: SHIPPED | OUTPATIENT
Start: 2025-05-06

## 2025-05-07 ENCOUNTER — RESULTS FOLLOW-UP (OUTPATIENT)
Dept: FAMILY MEDICINE | Facility: CLINIC | Age: 40
End: 2025-05-07

## 2025-05-07 ENCOUNTER — LAB (OUTPATIENT)
Dept: LAB | Facility: CLINIC | Age: 40
End: 2025-05-07
Payer: COMMERCIAL

## 2025-05-07 ENCOUNTER — MYC MEDICAL ADVICE (OUTPATIENT)
Dept: FAMILY MEDICINE | Facility: CLINIC | Age: 40
End: 2025-05-07

## 2025-05-07 DIAGNOSIS — E11.69 TYPE 2 DIABETES MELLITUS WITH OTHER SPECIFIED COMPLICATION, WITHOUT LONG-TERM CURRENT USE OF INSULIN (H): Primary | ICD-10-CM

## 2025-05-07 LAB
ANION GAP SERPL CALCULATED.3IONS-SCNC: 10 MMOL/L (ref 7–15)
BUN SERPL-MCNC: 10.5 MG/DL (ref 6–20)
CALCIUM SERPL-MCNC: 9.3 MG/DL (ref 8.8–10.4)
CHLORIDE SERPL-SCNC: 103 MMOL/L (ref 98–107)
CREAT SERPL-MCNC: 0.69 MG/DL (ref 0.51–0.95)
EGFRCR SERPLBLD CKD-EPI 2021: >90 ML/MIN/1.73M2
EST. AVERAGE GLUCOSE BLD GHB EST-MCNC: 157 MG/DL
GLUCOSE SERPL-MCNC: 149 MG/DL (ref 70–99)
HBA1C MFR BLD: 7.1 % (ref 0–5.6)
HCO3 SERPL-SCNC: 26 MMOL/L (ref 22–29)
POTASSIUM SERPL-SCNC: 4.9 MMOL/L (ref 3.4–5.3)
SODIUM SERPL-SCNC: 139 MMOL/L (ref 135–145)

## 2025-05-07 PROCEDURE — 83036 HEMOGLOBIN GLYCOSYLATED A1C: CPT

## 2025-05-07 PROCEDURE — 80048 BASIC METABOLIC PNL TOTAL CA: CPT

## 2025-05-07 PROCEDURE — 36415 COLL VENOUS BLD VENIPUNCTURE: CPT

## 2025-05-07 NOTE — TELEPHONE ENCOUNTER
Printed off form and completed to the best of my ability. Pt had labs drawn on different days. Form placed in PCP in basket for review.  Becki Alas CMA

## 2025-05-08 NOTE — TELEPHONE ENCOUNTER
Forms completed and faxed in to [phone]. Pt notified via [employer]. Form sent for scanning.  Becki Alas CMA

## 2025-06-10 ENCOUNTER — TRANSFERRED RECORDS (OUTPATIENT)
Dept: MULTI SPECIALTY CLINIC | Facility: CLINIC | Age: 40
End: 2025-06-10
Payer: COMMERCIAL

## 2025-06-10 LAB — RETINOPATHY: NORMAL

## 2025-06-24 DIAGNOSIS — E11.69 TYPE 2 DIABETES MELLITUS WITH OTHER SPECIFIED COMPLICATION, WITHOUT LONG-TERM CURRENT USE OF INSULIN (H): ICD-10-CM

## 2025-06-25 RX ORDER — SEMAGLUTIDE 2.68 MG/ML
INJECTION, SOLUTION SUBCUTANEOUS
Qty: 9 ML | Refills: 0 | Status: SHIPPED | OUTPATIENT
Start: 2025-06-25

## 2025-06-30 ENCOUNTER — OFFICE VISIT (OUTPATIENT)
Dept: FAMILY MEDICINE | Facility: CLINIC | Age: 40
End: 2025-06-30
Attending: FAMILY MEDICINE
Payer: COMMERCIAL

## 2025-06-30 VITALS
WEIGHT: 278 LBS | HEART RATE: 77 BPM | TEMPERATURE: 97.4 F | HEIGHT: 66 IN | OXYGEN SATURATION: 98 % | BODY MASS INDEX: 44.68 KG/M2 | DIASTOLIC BLOOD PRESSURE: 87 MMHG | RESPIRATION RATE: 17 BRPM | SYSTOLIC BLOOD PRESSURE: 136 MMHG

## 2025-06-30 DIAGNOSIS — E11.69 TYPE 2 DIABETES MELLITUS WITH OTHER SPECIFIED COMPLICATION, WITHOUT LONG-TERM CURRENT USE OF INSULIN (H): ICD-10-CM

## 2025-06-30 DIAGNOSIS — E11.65 TYPE 2 DIABETES MELLITUS WITH HYPERGLYCEMIA, WITHOUT LONG-TERM CURRENT USE OF INSULIN (H): Primary | ICD-10-CM

## 2025-06-30 DIAGNOSIS — E66.01 MORBID OBESITY (H): ICD-10-CM

## 2025-06-30 PROCEDURE — 90480 ADMN SARSCOV2 VAC 1/ONLY CMP: CPT | Performed by: FAMILY MEDICINE

## 2025-06-30 PROCEDURE — 3075F SYST BP GE 130 - 139MM HG: CPT | Performed by: FAMILY MEDICINE

## 2025-06-30 PROCEDURE — 1126F AMNT PAIN NOTED NONE PRSNT: CPT | Performed by: FAMILY MEDICINE

## 2025-06-30 PROCEDURE — 3079F DIAST BP 80-89 MM HG: CPT | Performed by: FAMILY MEDICINE

## 2025-06-30 PROCEDURE — 91320 SARSCV2 VAC 30MCG TRS-SUC IM: CPT | Performed by: FAMILY MEDICINE

## 2025-06-30 PROCEDURE — G2211 COMPLEX E/M VISIT ADD ON: HCPCS | Performed by: FAMILY MEDICINE

## 2025-06-30 PROCEDURE — 99214 OFFICE O/P EST MOD 30 MIN: CPT | Mod: 25 | Performed by: FAMILY MEDICINE

## 2025-06-30 RX ORDER — METFORMIN HYDROCHLORIDE 500 MG/1
1000 TABLET, EXTENDED RELEASE ORAL 2 TIMES DAILY WITH MEALS
Qty: 360 TABLET | Refills: 1 | Status: SHIPPED | OUTPATIENT
Start: 2025-06-30

## 2025-06-30 ASSESSMENT — PAIN SCALES - GENERAL: PAINLEVEL_OUTOF10: NO PAIN (0)

## 2025-06-30 NOTE — PROGRESS NOTES
"  Assessment & Plan     Type 2 diabetes mellitus with hyperglycemia, without long-term current use of insulin (H)  Type 2 diabetes mellitus with other specified complication, without long-term current use of insulin (H)  Small increase in her A1c above goal recently which she attributes to not wearing her CGM.  She is working to get back to healthy diet.  Given she has plateaued with weight loss on the Ozempic and given the slight increase in her A1c I think it would be good to transition to Mounjaro which she is willing to do.  Will start with 5 mg but can titrate dose up every 4 weeks as needed and tolerated reviewed.  She will message me prior to dose increase if needed.  Continues on ACE inhibitor but have not yet started statin or aspirin due to age  - tirzepatide (MOUNJARO) 5 MG/0.5ML SOAJ auto-injector pen; Inject 0.5 mLs (5 mg) subcutaneously once a week.  - FOOT EXAM  - metFORMIN (GLUCOPHAGE XR) 500 MG 24 hr tablet; Take 2 tablets (1,000 mg) by mouth 2 times daily (with meals).      Morbid obesity (H)  We talked through healthy diet and exercise.  Her  has been trying to get her to go to the gym with him and I also encouraged her to start this habit as I think it would be quite helpful for her weight loss journey.  Also encouraged her to get regular protein intake.  - tirzepatide (MOUNJARO) 5 MG/0.5ML SOAJ auto-injector pen; Inject 0.5 mLs (5 mg) subcutaneously once a week.  - FOOT EXAM          BMI  Estimated body mass index is 44.87 kg/m  as calculated from the following:    Height as of this encounter: 1.676 m (5' 6\").    Weight as of this encounter: 126.1 kg (278 lb).     COVID-vaccine updated today.    Follow-up    Follow-up Visit   Expected date:  Dec 30, 2025 (Approximate)      Follow Up Appointment Details:     Follow-up with whom?: Me    Follow-Up for what?: Adult Preventive    Any Additional Chronic Condition Management?:  Diabetes  Hypertension       How?: In Person             The " longitudinal plan of care for the diagnosis(es)/condition(s) as documented were addressed during this visit. Due to the added complexity in care, I will continue to support Lindsay in the subsequent management and with ongoing continuity of care.    Jerica Montoya is a 39 year old, presenting for the following health issues:  Diabetes      6/30/2025     9:50 AM   Additional Questions   Roomed by Iza   Accompanied by self         6/30/2025     9:50 AM   Patient Reported Additional Medications   Patient reports taking the following new medications no     Via the Health Maintenance questionnaire, the patient has reported the following services have been completed -Eye Exam: [facility] 2025-06-10, this information has been sent to the abstraction team.  History of Present Illness       Diabetes:   She presents for follow up of diabetes.     She checks blood glucose before and after meals and at bedtime.  Blood glucose is sometimes over 200 and never under 70.  When her blood glucose is low, the patient is asymptomatic for confusion, blurred vision, lethargy and reports not feeling dizzy, shaky, or weak.      She is not experiencing numbness or burning in feet, excessive thirst, blurry vision, weight changes or redness, sores or blisters on feet. The patient has had a diabetic eye exam in the last 12 months. Eye exam performed on Ricarda 10 2025. Location of last eye exam [facility].        She eats 2-3 servings of fruits and vegetables daily.She consumes 0 sweetened beverage(s) daily.She exercises with enough effort to increase her heart rate 20 to 29 minutes per day.  She exercises with enough effort to increase her heart rate 3 or less days per week.   She is not taking prescribed medications regularly due to remembering to take.          Diabetes Follow-up    How often are you checking your blood sugars? Continuous glucose monitor   What time of day are you checking your blood sugars Before and after meals    At bedtime    Have you had any blood sugars above 200? Yes   Have you had any blood sugars below 70? No   Which symptoms do you notice when your blood sugar is low? None   What concerns do you have today about your diabetes? None   Do you have any of these symptoms? None of these symptoms   Have you had a diabetic eye exam within the last year? Yes   Date of last eye exam: Ricarda 10 2025   Where was this eye exam done? [facility]         BP Readings from Last 2 Encounters:   06/30/25 136/87   12/04/23 (!) 147/98     Hemoglobin A1C (%)   Date Value   05/07/2025 7.1 (H)   11/20/2024 6.8 (H)   10/18/2019 10.4 (H)     LDL Cholesterol Calculated (mg/dL)   Date Value   11/20/2024 75   11/04/2023 79   10/18/2019 100 (H)         How many servings of fruits and vegetables do you eat daily? 2-3   On average, how many sweetened beverages do you drink each day (Examples: soda, juice, sweet tea, etc.  Do NOT count diet or artificially sweetened beverages)? 0   How many minutes a day do you exercise enough to make your heart beat faster? 20 to 29   How many days a week do you exercise enough to make your heart beat faster? 3 or less   How many days per week do you miss taking your medication? 2   What makes it hard for you to take your medication every day? remembering to take         [place] upcoming.     Set back with not having cgm for a while. Is more conscious about food choices when wearing it.   Restarting wearing it recently and working to improve her diet.   Avg gluc last 30 days was 160 which is improved.     Wt trending down.   Tolerating ozempic.   Mild nausea after injection x 1 day.   Last few months, appetite control has plateaued.    Resistance bands for exercise.     Burning in feet occ but no numbness        Review of Systems  Constitutional, HEENT, cardiovascular, pulmonary, gi and gu systems are negative, except as otherwise noted.      Objective    /87 (BP Location: Right arm, Patient Position: Sitting,  "Cuff Size: Adult Large)   Pulse 77   Temp 97.4  F (36.3  C) (Temporal)   Resp 17   Ht 1.676 m (5' 6\")   Wt 126.1 kg (278 lb)   LMP 05/30/2025 (Approximate)   SpO2 98%   BMI 44.87 kg/m    Body mass index is 44.87 kg/m .  Physical Exam   GENERAL: alert and no distress  NECK: no adenopathy, no asymmetry, masses, or scars  RESP: lungs clear to auscultation - no rales, rhonchi or wheezes  CV: regular rate and rhythm, normal S1 S2, no S3 or S4, no murmur, click or rub, no peripheral edema  ABDOMEN: soft, nontender, no hepatosplenomegaly, no masses and bowel sounds normal  MS: no gross musculoskeletal defects noted, no edema  PSYCH: mentation appears normal, affect normal/bright  Diabetic foot exam: normal DP and PT pulses, no trophic changes or ulcerative lesions, normal sensory exam, and normal monofilament exam    No results found for this or any previous visit (from the past 720 hours).  Component      Latest Ref Rng 5/7/2025  10:02 AM   Sodium      135 - 145 mmol/L 139    Potassium      3.4 - 5.3 mmol/L 4.9    Chloride      98 - 107 mmol/L 103    Carbon Dioxide (CO2)      22 - 29 mmol/L 26    Anion Gap      7 - 15 mmol/L 10    Urea Nitrogen      6.0 - 20.0 mg/dL 10.5    Creatinine      0.51 - 0.95 mg/dL 0.69    GFR Estimate      >60 mL/min/1.73m2 >90    Calcium      8.8 - 10.4 mg/dL 9.3    Glucose      70 - 99 mg/dL 149 (H)    Estimated Average Glucose      <117 mg/dL 157 (H)    Hemoglobin A1C      0.0 - 5.6 % 7.1 (H)       Legend:  (H) High        Signed Electronically by: Micaela Padilla MD    "

## 2025-06-30 NOTE — PROGRESS NOTES
Prior to immunization administration, verified patients identity using patient s name and date of birth. Please see Immunization Activity for additional information.     Screening Questionnaire for Adult Immunization    Are you sick today?   No   Do you have allergies to medications, food, a vaccine component or latex?   No   Have you ever had a serious reaction after receiving a vaccination?   No   Do you have a long-term health problem with heart, lung, kidney, or metabolic disease (e.g., diabetes), asthma, a blood disorder, no spleen, complement component deficiency, a cochlear implant, or a spinal fluid leak?  Are you on long-term aspirin therapy?   No   Do you have cancer, leukemia, HIV/AIDS, or any other immune system problem?   No   Do you have a parent, brother, or sister with an immune system problem?   No   In the past 3 months, have you taken medications that affect  your immune system, such as prednisone, other steroids, or anticancer drugs; drugs for the treatment of rheumatoid arthritis, Crohn s disease, or psoriasis; or have you had radiation treatments?   No   Have you had a seizure, or a brain or other nervous system problem?   No   During the past year, have you received a transfusion of blood or blood    products, or been given immune (gamma) globulin or antiviral drug?   No   For women: Are you pregnant or is there a chance you could become       pregnant during the next month?   No   Have you received any vaccinations in the past 4 weeks?   No     Immunization questionnaire answers were all negative.      Patient instructed to remain in clinic for 15 minutes afterwards, and to report any adverse reactions.     Screening performed by Arlyn Curry on 6/30/2025 at 10:26 AM.    Answers submitted by the patient for this visit:  Diabetes Visit (Submitted on 6/30/2025)  Chief Complaint: Chronic problems general questions HPI Form  Paraesthesia present:: none of these symptoms  What time of day are you  checking your blood sugars : before and after meals, at bedtime  Have you had any blood sugars above 200?: Yes  Have you had any blood sugars below 70?: No  Hypoglycemia symptoms:: none  Have you had a diabetic eye exam within the last year?: Yes  Date of last eye exam: : Ricarda 10 2025  Where was this eye exam done?: [facility]  General Questionnaire (Submitted on 6/30/2025)  Chief Complaint: Chronic problems general questions HPI Form  How many servings of fruits and vegetables do you eat daily?: 2-3  On average, how many sweetened beverages do you drink each day (Examples: soda, juice, sweet tea, etc.  Do NOT count diet or artificially sweetened beverages)?: 0  How many minutes a day do you exercise enough to make your heart beat faster?: 20 to 29  How many days a week do you exercise enough to make your heart beat faster?: 3 or less  What makes it hard for you to take your medication every day?: remembering to take  Questionnaire about: Chronic problems general questions HPI Form (Submitted on 6/30/2025)  Chief Complaint: Chronic problems general questions HPI Form    Conflicting answers have been found for some questions. Please document the patient's answers manually.

## 2025-08-09 ENCOUNTER — HEALTH MAINTENANCE LETTER (OUTPATIENT)
Age: 40
End: 2025-08-09

## 2025-08-12 DIAGNOSIS — E11.65 TYPE 2 DIABETES MELLITUS WITH HYPERGLYCEMIA, WITHOUT LONG-TERM CURRENT USE OF INSULIN (H): ICD-10-CM

## 2025-08-12 DIAGNOSIS — E66.01 MORBID OBESITY (H): ICD-10-CM

## 2025-08-12 RX ORDER — TIRZEPATIDE 5 MG/.5ML
INJECTION, SOLUTION SUBCUTANEOUS
Qty: 4 ML | Refills: 2 | Status: SHIPPED | OUTPATIENT
Start: 2025-08-12

## 2025-08-18 ENCOUNTER — MYC MEDICAL ADVICE (OUTPATIENT)
Dept: FAMILY MEDICINE | Facility: CLINIC | Age: 40
End: 2025-08-18
Payer: COMMERCIAL